# Patient Record
Sex: FEMALE | Race: WHITE | NOT HISPANIC OR LATINO | Employment: FULL TIME | URBAN - METROPOLITAN AREA
[De-identification: names, ages, dates, MRNs, and addresses within clinical notes are randomized per-mention and may not be internally consistent; named-entity substitution may affect disease eponyms.]

---

## 2023-04-12 ENCOUNTER — OFFICE VISIT (OUTPATIENT)
Dept: PRIMARY CARE | Facility: CLINIC | Age: 48
End: 2023-04-12
Payer: COMMERCIAL

## 2023-04-12 VITALS
HEIGHT: 64 IN | WEIGHT: 142.8 LBS | OXYGEN SATURATION: 99 % | SYSTOLIC BLOOD PRESSURE: 111 MMHG | HEART RATE: 79 BPM | RESPIRATION RATE: 18 BRPM | BODY MASS INDEX: 24.38 KG/M2 | DIASTOLIC BLOOD PRESSURE: 76 MMHG

## 2023-04-12 DIAGNOSIS — F41.8 DEPRESSION WITH ANXIETY: Primary | ICD-10-CM

## 2023-04-12 PROCEDURE — 99214 OFFICE O/P EST MOD 30 MIN: CPT | Performed by: SPECIALIST

## 2023-04-12 PROCEDURE — 1036F TOBACCO NON-USER: CPT | Performed by: SPECIALIST

## 2023-04-12 RX ORDER — ZOLPIDEM TARTRATE 10 MG/1
5 TABLET ORAL NIGHTLY PRN
COMMUNITY
End: 2023-10-10 | Stop reason: SDUPTHER

## 2023-04-12 RX ORDER — ALPRAZOLAM 0.25 MG/1
0.25 TABLET ORAL 2 TIMES DAILY PRN
Qty: 30 TABLET | Refills: 0 | Status: SHIPPED | OUTPATIENT
Start: 2023-04-12 | End: 2023-06-19

## 2023-04-12 RX ORDER — SERTRALINE HYDROCHLORIDE 50 MG/1
50 TABLET, FILM COATED ORAL DAILY
Qty: 90 TABLET | Refills: 0 | Status: SHIPPED | OUTPATIENT
Start: 2023-04-12 | End: 2023-06-14 | Stop reason: ALTCHOICE

## 2023-04-12 RX ORDER — ALPRAZOLAM 0.25 MG/1
0.25 TABLET ORAL 2 TIMES DAILY PRN
Qty: 30 TABLET | Refills: 0 | Status: SHIPPED | OUTPATIENT
Start: 2023-04-12 | End: 2023-04-12 | Stop reason: SDUPTHER

## 2023-04-12 ASSESSMENT — ENCOUNTER SYMPTOMS
DEPRESSION: 1
LOSS OF SENSATION IN FEET: 0
OCCASIONAL FEELINGS OF UNSTEADINESS: 0

## 2023-04-12 NOTE — PROGRESS NOTES
Subjective   Patient ID: Hermelinda Whelan is a 48 y.o. female who presents for Depression.    HPI    Tearful x few days ago but not well for ten days not sleeping well.  Brought notes, feeling a state of panic,   No known trigger, had a couple of nights of poor sleep and can over react when that happens in past. Starts to think the medication is not working well.  No one is hurting her at home, feels safe.    On zolpidem if she can fall asleep usually can stay asleep  Urine 6/2022    In early 20s, tried medication for sleep and trazodone (hated it), maybe something else and maybe an antidepressant  Had short term anxiety in past  (travelling) took in past low dose xanax in past which helped her.  Only took once.  Friends suggested CBC but she has not done that.    Thinks a hormonal component, got her menses yesterday.  Yesterday felt great and thought it was PMS but now upset since feels bad again today    Colonoscopy 2/15/23 repeat 10 years    Has upcoming trip to [place] for family event  Able to do workoutsOARRS:  Yvonne Arango, DO on 4/12/2023  2:50 PM  I have personally reviewed the OARRS report for Hermelinda Whelan. I have considered the risks of abuse, dependence, addiction and diversion    Is the patient prescribed a combination of a benzodiazepine and opioid?  No    Last Urine Drug Screen / ordered today: Yes  Recent Results (from the past 77244 hour(s))   OPIATE/OPIOID/BENZO PRESCRIPTION COMPLIANCE    Collection Time: 06/24/22  8:49 AM   Result Value Ref Range    DRUG SCREEN COMMENT URINE SEE BELOW     Creatine, Urine 83.3 mg/dL    Amphetamine Screen, Urine PRESUMPTIVE NEGATIVE NEGATIVE    Barbiturate Screen, Urine PRESUMPTIVE NEGATIVE NEGATIVE    Cannabinoid Screen, Urine PRESUMPTIVE NEGATIVE NEGATIVE    Cocaine Screen, Urine PRESUMPTIVE NEGATIVE NEGATIVE    PCP Screen, Urine PRESUMPTIVE NEGATIVE NEGATIVE    7-Aminoclonazepam <25 Cutoff <25 ng/mL    Alpha-Hydroxyalprazolam <25 Cutoff <25 ng/mL     Alpha-Hydroxymidazolam <25 Cutoff <25 ng/mL    Alprazolam <25 Cutoff <25 ng/mL    Chlordiazepoxide <25 Cutoff <25 ng/mL    Clonazepam <25 Cutoff <25 ng/mL    Diazepam <25 Cutoff <25 ng/mL    Lorazepam <25 Cutoff <25 ng/mL    Midazolam <25 Cutoff <25 ng/mL    Nordiazepam <25 Cutoff <25 ng/mL    Oxazepam <25 Cutoff <25 ng/mL    Temazepam <25 Cutoff <25 ng/mL    Zolpidem <25 Cutoff <25 ng/mL    Zolpidem Metabolite (ZCA) >1000 (A) Cutoff <25 ng/mL    6-Acetylmorphine <25 Cutoff <25 ng/mL    Codeine <50 Cutoff <50 ng/mL    Hydrocodone <25 Cutoff <25 ng/mL    Hydromorphone <25 Cutoff <25 ng/mL    Morphine Urine <50 Cutoff <50 ng/mL    Norhydrocodone <25 Cutoff <25 ng/mL    Noroxycodone <25 Cutoff <25 ng/mL    Oxycodone <25 Cutoff <25 ng/mL    Oxymorphone <25 Cutoff <25 ng/mL    Tramadol <50 Cutoff <50 ng/mL    O-Desmethyltramadol <50 Cutoff <50 ng/mL    Fentanyl <2.5 Cutoff<2.5 ng/mL    Norfentanyl <2.5 Cutoff<2.5 ng/mL    METHADONE CONFIRMATION,URINE <25 Cutoff <25 ng/mL    EDDP <25 Cutoff <25 ng/mL     Results are as expected.     Controlled Substance Agreement:  Date of the Last Agreement:   today  Reviewed Controlled Substance Agreement including but not limited to the benefits, risks, and alternatives to treatment with a Controlled Substance medication(s).    Benzodiazepines:  What is the patient's goal of therapy? Improved mood sleep and no panic attacs  Is this being achieved with current treatment? Just startign newmeds    AB-7:  No data recorded    Activities of Daily Living:   Is your overall impression that this patient is benefiting (symptom reduction outweighs side effects) from benzodiazepine therapy? Yes     1. Physical Functioning: Better  2. Family Relationship: Better  3. Social Relationship: Better  4. Mood: Better  5. Sleep Patterns: Better  6. Overall Function: Better and Sleep Aids:   What is the patient's goal of therapy? Sleep and not have panic attacks, also starting zoloft and referral for  therapy and med management   Is this being achieved with current treatment? Just starting new medications    Activities of Daily Living:   Is your overall impression that this patient is benefiting (symptom reduction outweighs side effects) from sleep aid therapy? Yes     1. Physical Functioning: Better  2. Family Relationship: Better  3. Social Relationship: Better  4. Mood: Better  5. Sleep Patterns: Better  6. Overall Function: Better      Review of Systems  Mood:  Positive anxiety and panic, No thoughts of self harm, but does not want to continue to feel this way, feeling total overwhelming, feels sad, hopeless  Cardiac No cp no palpitations but feels like heart is pounding  Resp:  No sob but doesn't feel like she is breathing normally when anxious no cough    Objective   Physical Exam  General:    Tearful, anxious F in no acute distress, well nourished, well hydrated  Head:  Normocephalic, atraumatic  Skin:          Warm dry,   Eyes:  Anicteric sclera, pupils equal,   Oral:      Mask in place, Not examined due to pandemic  Neck:   Supple  Cor:      Regular rate, normal S1, S2, no murmurs appreciated, no S3, no S4   Lungs:   Clear to auscultation b/l, no wheezes, no rhonchi, no crackles, no accessory respiratory muscle use    Assessment/Plan   Problem List Items Addressed This Visit          Other    Depression with anxiety - Primary     -Does sleep meditation tapes and tried yoga, walking dog, exercise and still having tremendous anxiety with panic and feels overwhelmed  -[facility] for [facility]  -Start Zoloft 50 mg daily to take one-half tablet for 4 days then increase to one tablet daily  -Ordered Xanax for short term use for panic         Relevant Medications    sertraline (Zoloft) 50 mg tablet    ALPRAZolam (Xanax) 0.25 mg tablet    Other Relevant Orders    TSH    T4, free    Referral to [facility]    Referral to [facility]    [facility]    Comprehensive Metabolic Panel          Yvonne Arango DO     Addendum:  4/10/2023 7:57 pm Paged by patient at approx 7:30 pm [facility] original prescription was sent to said not in stock, would not transfer to new [facility] since controlled substance, said in stock at [facility] on [address], and resent to that pharmacy.  Yvonne Arango DO

## 2023-04-12 NOTE — ASSESSMENT & PLAN NOTE
-Does sleep meditation tapes and tried yoga, walking dog, exercise and still having tremendous anxiety with panic and feels overwhelmed  -[facility] and for [facility]iatry for medication  -Start Zoloft 50 mg daily to take one-half tablet for 4 days then increase to one tablet daily  -Ordered Xanax for short term use for panic

## 2023-06-14 ENCOUNTER — LAB (OUTPATIENT)
Dept: LAB | Facility: LAB | Age: 48
End: 2023-06-14
Payer: COMMERCIAL

## 2023-06-14 ENCOUNTER — OFFICE VISIT (OUTPATIENT)
Dept: PRIMARY CARE | Facility: CLINIC | Age: 48
End: 2023-06-14
Payer: COMMERCIAL

## 2023-06-14 VITALS
SYSTOLIC BLOOD PRESSURE: 91 MMHG | DIASTOLIC BLOOD PRESSURE: 58 MMHG | BODY MASS INDEX: 24.31 KG/M2 | HEIGHT: 64 IN | WEIGHT: 142.4 LBS | HEART RATE: 52 BPM

## 2023-06-14 DIAGNOSIS — F41.9 ANXIETY: ICD-10-CM

## 2023-06-14 DIAGNOSIS — F51.04 CHRONIC INSOMNIA: ICD-10-CM

## 2023-06-14 DIAGNOSIS — Z00.00 ANNUAL PHYSICAL EXAM: Primary | ICD-10-CM

## 2023-06-14 DIAGNOSIS — R92.30 DENSE BREAST TISSUE ON MAMMOGRAM: ICD-10-CM

## 2023-06-14 PROBLEM — Z86.32 HISTORY OF GESTATIONAL DIABETES MELLITUS (GDM): Status: ACTIVE | Noted: 2022-06-14

## 2023-06-14 PROBLEM — L98.9 SKIN LESION: Status: ACTIVE | Noted: 2023-06-14

## 2023-06-14 PROBLEM — F41.8 DEPRESSION WITH ANXIETY: Status: RESOLVED | Noted: 2023-04-12 | Resolved: 2023-06-14

## 2023-06-14 PROCEDURE — 80373 DRUG SCREENING TRAMADOL: CPT

## 2023-06-14 PROCEDURE — 80368 SEDATIVE HYPNOTICS: CPT

## 2023-06-14 PROCEDURE — 80307 DRUG TEST PRSMV CHEM ANLYZR: CPT

## 2023-06-14 PROCEDURE — 80365 DRUG SCREENING OXYCODONE: CPT

## 2023-06-14 PROCEDURE — 1036F TOBACCO NON-USER: CPT | Performed by: SPECIALIST

## 2023-06-14 PROCEDURE — 80354 DRUG SCREENING FENTANYL: CPT

## 2023-06-14 PROCEDURE — 80346 BENZODIAZEPINES1-12: CPT

## 2023-06-14 PROCEDURE — 99396 PREV VISIT EST AGE 40-64: CPT | Performed by: SPECIALIST

## 2023-06-14 PROCEDURE — 80361 OPIATES 1 OR MORE: CPT

## 2023-06-14 PROCEDURE — 80358 DRUG SCREENING METHADONE: CPT

## 2023-06-14 NOTE — ASSESSMENT & PLAN NOTE
Thinks sleeplessness is triggering the anxiety  Previously signed 2 agreements, one for Ambien (other for xanax)  Does not need refill of Ambien 10 mg  yet

## 2023-06-14 NOTE — ASSESSMENT & PLAN NOTE
Undergoing therapy, will order #30 Xanax once urine is back, she takes sparingly, understands need for visit every 3 months  [facility] [address]

## 2023-06-14 NOTE — PROGRESS NOTES
Subjective   Patient ID: Hermelinda Whelan is a 48 y.o. female who presents for Annual Exam.  HPI    49 yo female Pmhx sig for Insomnia, Travel Anxiety, Gestational DM (1 of her pregnancies) and Fam Hx Melanoma (Father) presents for annual physical    Saw her in April, started Zoloft and Xanax for panic/anxiety and saw [facility]  Started both medications, within days felt worse but xanax helped, felt like the bottom of her body was falling out.  She stuck with the zoloft, went to [place], felt awful.  Sister in law's brother passed away suddenly and felt bad there.      Was barely sleeping and eating and feeling terrible, end of April was feeling a little better and had telehealth appt with [facility].  Thought she got bad generic version of Ambien and provider has seen it with ambien and adderall and thought that the generic had less active ingredient.      Psych recommended stopping ambien and she had been given a different generic.  She tapered off zoloft May 1.  No longer taking Xanax took last one about a week ago.  May was mostly okay.  Started feeling more like herself and was eating better.  Weight back to normal now.    May 1 to early June mostly back to normal.     Also started seeing telehealth therapist which she is continuing, met 4 times biweekly  Ambien has made a difference and psych told her to take an extra half tab if the 5 mg ambien is not working.      Over last 2 weeks has had some nights of poor sleep.  Also has isolated periods of more acute anxiety.  Has another big trip coming up.      Had been feeling much better until last week.  Not as bad as 2 months ago.  Having Xanax in April really truly helped.  Over the course of 2-3 weeks had taken about 25 pills up through end of May had 5-7 left which has taken now.  Goes weeks without taking them.     OARRS:  Yvonne Arango, DO on 6/14/2023 11:07 AM  I have personally reviewed the OARRS report for Hermelinda Whelan. I have  considered the risks of abuse, dependence, addiction and diversion    Is the patient prescribed a combination of a benzodiazepine and opioid?  No    Last Urine Drug Screen / ordered today: Yes  Recent Results (from the past 86462 hour(s))   OPIATE/OPIOID/BENZO PRESCRIPTION COMPLIANCE    Collection Time: 06/14/23 11:56 AM   Result Value Ref Range    DRUG SCREEN COMMENT URINE SEE BELOW     Creatine, Urine 35.7 mg/dL    Amphetamine Screen, Urine PRESUMPTIVE NEGATIVE NEGATIVE    Barbiturate Screen, Urine PRESUMPTIVE NEGATIVE NEGATIVE    Cannabinoid Screen, Urine PRESUMPTIVE NEGATIVE NEGATIVE    Cocaine Screen, Urine PRESUMPTIVE NEGATIVE NEGATIVE    PCP Screen, Urine PRESUMPTIVE NEGATIVE NEGATIVE     Results are as expected.     Controlled Substance Agreement:  Date of the Last Agreement::  4/12/2023  Reviewed Controlled Substance Agreement including but not limited to the benefits, risks, and alternatives to treatment with a Controlled Substance medication(s).    Benzodiazepines:  What is the patient's goal of therapy? Episodic anxiety  Is this being achieved with current treatment? yes    AB-7:  No data recorded    Activities of Daily Living:   Is your overall impression that this patient is benefiting (symptom reduction outweighs side effects) from benzodiazepine therapy? Yes     1. Physical Functioning: Better  2. Family Relationship: Better  3. Social Relationship: Better  4. Mood: Better  5. Sleep Patterns: Better  6. Overall Function: Better    No Known Allergies   Current Outpatient Medications   Medication Instructions    ALPRAZolam (XANAX) 0.25 mg, oral, 2 times daily PRN    zolpidem (AMBIEN) 5 mg, oral, Nightly PRN       Review of Systems  Constitutional  No fatigue, no fevers, no chills, no unintentional weight loss,   Mood is 5/10, ususally is 7.5-8/10, few mos ago 1/10, No anhedonia, some times sad, not tearful,   HEENT:  No headaches, no dizziness, no double vision, no blurred vision, no hearing  "loss  Cardiovascular:  No chest pain, no palpitations, no shortness of breath with exertion (one flight of stairs),   Respiratory:  No cough, no hemoptysis, no wheezing, No shortness of breath at rest  GI:  No dysphagia, no odynophagia, no reflux, no abdominal pain, no nausea, no vomiting, no changes in bowel habits, no bright red blood per rectum, no melena  :  No urinary frequency, no dysuria, no urine incontinence  MSK:  No falls, no joint pain, no joint swelling  Neuro:  No tremors, no extremity weakness, no changes in sensation  Breasts:  no abnormalities on self breast exam, no nipple discharge no skin changes    Physical Exam  BP 91/58   Pulse 52   Ht 1.626 m (5' 4\")   Wt 64.6 kg (142 lb 6.4 oz)   BMI 24.44 kg/m²   General:    Well-appearing  F in no acute distress, well nourished, well hydrated  Head:  Normocephalic, atraumatic  Skin:          Warm dry,   Eyes:  Anicteric sclera, pupils equal,   Ears:        TMs intact  Oral:      Mask in place  Neck:   Supple, no cervical/supraclavicular adenopathy, no thyromegaly or nodules appreciated on exam  Cor:      Regular rate, normal S1, S2, no murmurs appreciated, no S3, no S4   Lungs:   Clear to auscultation b/l, no wheezes, no rhonchi, no crackles, no accessory respiratory muscle use  Abd:          Soft, nontender, no guarding, no rebound, no hepatosplenomegaly appreciated   Ext:            No lower extremity edema, no palpable cords  Pulses:      Pedal pulses intact  Neuro:   CN2-12 grossly intact  (except CN 9-10, 12 not examined due to pandemic)     Assessment/Plan   Problem List Items Addressed This Visit       Anxiety     Undergoing therapy, will order #30 Xanax once urine is back, she takes sparingly, understands need for visit every 3 months  [facility] [address]         Relevant Orders    Opiate/Opioid/Benzo Extended Prescription Compliance    Chronic insomnia     Thinks sleeplessness is triggering the anxiety  Previously signed 2 " agreements, one for Ambien (other for xanax)  Does not need refill of Ambien 10 mg  yet         Relevant Orders    Opiate/Opioid/Benzo Extended Prescription Compliance    Annual physical exam - Primary     -Recommend annual influenza vaccine  -Previously received 2 Covid vaccines 1 booster and one bivalent in fall 2022  -Prior Tdap 10 years ago (when daughter was born), recommend Tdap (to check your insurance coverage)  -Continue annual gynecology exams (sees gynecology at Clinic but retired so plans to see gyne here)  - Continue annual mammograms (12/5/2022 at [facility]), dense breast tissue, fam hx breast cancer paternal side of family  -Recommend regular weight bearing exercise as tolerated (walking) and has a peleton  -Recommend Vitamin D3 cholecalciferol 1000 units daily  -Recommend daily calcium intake of 1000 units ideally through diet (or diet and supplement)  -Recommend Colonoscopy 2/15/2023 repeat 10 years  -Recommend annual dermatology evaluations (family hx of melanoma) did past summer  -Normal BMI 24  -Labs ordered: CMP, CBC, Fx Lipids         Relevant Orders    CBC    Comprehensive Metabolic Panel    Lipid Panel    Dense breast tissue on mammogram     Discussed self pay FAST MRI for dense breast               Yvonne Arango,

## 2023-06-14 NOTE — ASSESSMENT & PLAN NOTE
-Recommend annual influenza vaccine  -Previously received 2 Covid vaccines 1 booster and one bivalent in fall 2022  -Prior Tdap 10 years ago (when daughter was born), recommend Tdap (to check your insurance coverage)  -Continue annual gynecology exams (sees gynecology at Clinic but retired so plans to see gyne here)  - Continue annual mammograms ([facility]), dense breast tissue, fam hx breast cancer paternal side of family  -Recommend regular weight bearing exercise as tolerated (walking) and has a peleton  -Recommend Vitamin D3 cholecalciferol 1000 units daily  -Recommend daily calcium intake of 1000 units ideally through diet (or diet and supplement)  -Recommend Colonoscopy 2/15/2023 repeat 10 years  -Recommend annual dermatology evaluations (family hx of melanoma) did past summer  -Normal BMI 24  -Labs ordered: CMP, CBC, Fx Lipids

## 2023-06-19 LAB
6-ACETYLMORPHINE: <25 NG/ML
7-AMINOCLONAZEPAM: <25 NG/ML
ALPHA-HYDROXYALPRAZOLAM: <25 NG/ML
ALPHA-HYDROXYMIDAZOLAM: <25 NG/ML
ALPRAZOLAM: <25 NG/ML
AMPHETAMINE (PRESENCE) IN URINE BY SCREEN METHOD: ABNORMAL
BARBITURATES PRESENCE IN URINE BY SCREEN METHOD: ABNORMAL
CANNABINOIDS IN URINE BY SCREEN METHOD: ABNORMAL
CHLORDIAZEPOXIDE: <25 NG/ML
CLONAZEPAM: <25 NG/ML
COCAINE (PRESENCE) IN URINE BY SCREEN METHOD: ABNORMAL
CODEINE: <50 NG/ML
CREATINE, URINE FOR DRUG: 35.7 MG/DL
DIAZEPAM: <25 NG/ML
DRUG SCREEN COMMENT URINE: ABNORMAL
EDDP: <25 NG/ML
FENTANYL CONFIRMATION, URINE: <2.5 NG/ML
HYDROCODONE: <25 NG/ML
HYDROMORPHONE: <25 NG/ML
LORAZEPAM: <25 NG/ML
METHADONE CONFIRMATION,URINE: <25 NG/ML
MIDAZOLAM: <25 NG/ML
MORPHINE URINE: <50 NG/ML
NORDIAZEPAM: <25 NG/ML
NORFENTANYL: <2.5 NG/ML
NORHYDROCODONE: <25 NG/ML
NOROXYCODONE: <25 NG/ML
O-DESMETHYLTRAMADOL: <50 NG/ML
OXAZEPAM: <25 NG/ML
OXYCODONE: <25 NG/ML
OXYMORPHONE: <25 NG/ML
PHENCYCLIDINE (PRESENCE) IN URINE BY SCREEN METHOD: ABNORMAL
TEMAZEPAM: <25 NG/ML
TRAMADOL: <50 NG/ML
ZOLPIDEM METABOLITE (ZCA): 710 NG/ML
ZOLPIDEM: <25 NG/ML

## 2023-09-22 ENCOUNTER — OFFICE VISIT (OUTPATIENT)
Dept: PRIMARY CARE | Facility: CLINIC | Age: 48
End: 2023-09-22
Payer: COMMERCIAL

## 2023-09-22 VITALS
HEART RATE: 56 BPM | DIASTOLIC BLOOD PRESSURE: 64 MMHG | BODY MASS INDEX: 24.37 KG/M2 | SYSTOLIC BLOOD PRESSURE: 95 MMHG | OXYGEN SATURATION: 98 % | RESPIRATION RATE: 18 BRPM | WEIGHT: 142 LBS

## 2023-09-22 DIAGNOSIS — F51.04 CHRONIC INSOMNIA: Primary | ICD-10-CM

## 2023-09-22 DIAGNOSIS — F41.8 DEPRESSION WITH ANXIETY: ICD-10-CM

## 2023-09-22 DIAGNOSIS — F41.9 ANXIETY: ICD-10-CM

## 2023-09-22 PROCEDURE — 99213 OFFICE O/P EST LOW 20 MIN: CPT | Performed by: SPECIALIST

## 2023-09-22 PROCEDURE — 1036F TOBACCO NON-USER: CPT | Performed by: SPECIALIST

## 2023-09-22 RX ORDER — ALPRAZOLAM 0.25 MG/1
0.25 TABLET ORAL 2 TIMES DAILY PRN
Qty: 30 TABLET | Refills: 0 | Status: SHIPPED | OUTPATIENT
Start: 2023-09-22 | End: 2023-12-06 | Stop reason: SDUPTHER

## 2023-09-22 NOTE — ASSESSMENT & PLAN NOTE
Takes xanax sparingly as needed for anxiety  CSA 6/14/2023  Urine done 6/14/2023 consistent  Refilled xanax, f/up 3 mos

## 2023-09-22 NOTE — ASSESSMENT & PLAN NOTE
Takes ambien as needed for sleep   Does not yet need refil  CSA 6/14/2023  Urine 6/14/2023  Has follow-up

## 2023-09-22 NOTE — PROGRESS NOTES
Subjective   Patient ID: Hermelinda Whelan is a 48 y.o. female who presents for Follow-up.  HPI    47 yo female Pmhx sig for Insomnia, Travel Anxiety, Gestational DM (1 of her pregnancies) and Fam Hx Melanoma (Father) presents for medication follow-up    Did CPE 6/2023    OARRS:  Yvonne Arango,  on 9/22/2023  8:19 AM  I have personally reviewed the OARRS report for Hermelinda Whelan. I have considered the risks of abuse, dependence, addiction and diversion    Is the patient prescribed a combination of a benzodiazepine and opioid?  No    Last Urine Drug Screen / ordered today: No urine just done 6/14/2023  Recent Results (from the past 8760 hour(s))   OPIATE/OPIOID/BENZO PRESCRIPTION COMPLIANCE    Collection Time: 06/14/23 11:56 AM   Result Value Ref Range    DRUG SCREEN COMMENT URINE SEE BELOW     Creatine, Urine 35.7 mg/dL    Amphetamine Screen, Urine PRESUMPTIVE NEGATIVE NEGATIVE    Barbiturate Screen, Urine PRESUMPTIVE NEGATIVE NEGATIVE    Cannabinoid Screen, Urine PRESUMPTIVE NEGATIVE NEGATIVE    Cocaine Screen, Urine PRESUMPTIVE NEGATIVE NEGATIVE    PCP Screen, Urine PRESUMPTIVE NEGATIVE NEGATIVE    7-Aminoclonazepam <25 Cutoff <25 ng/mL    Alpha-Hydroxyalprazolam <25 Cutoff <25 ng/mL    Alpha-Hydroxymidazolam <25 Cutoff <25 ng/mL    Alprazolam <25 Cutoff <25 ng/mL    Chlordiazepoxide <25 Cutoff <25 ng/mL    Clonazepam <25 Cutoff <25 ng/mL    Diazepam <25 Cutoff <25 ng/mL    Lorazepam <25 Cutoff <25 ng/mL    Midazolam <25 Cutoff <25 ng/mL    Nordiazepam <25 Cutoff <25 ng/mL    Oxazepam <25 Cutoff <25 ng/mL    Temazepam <25 Cutoff <25 ng/mL    Zolpidem <25 Cutoff <25 ng/mL    Zolpidem Metabolite (ZCA) 710 (A) Cutoff <25 ng/mL    6-Acetylmorphine <25 Cutoff <25 ng/mL    Codeine <50 Cutoff <50 ng/mL    Hydrocodone <25 Cutoff <25 ng/mL    Hydromorphone <25 Cutoff <25 ng/mL    Morphine Urine <50 Cutoff <50 ng/mL    Norhydrocodone <25 Cutoff <25 ng/mL    Noroxycodone <25 Cutoff <25 ng/mL    Oxycodone <25  Cutoff <25 ng/mL    Oxymorphone <25 Cutoff <25 ng/mL    Tramadol <50 Cutoff <50 ng/mL    O-Desmethyltramadol <50 Cutoff <50 ng/mL    Fentanyl <2.5 Cutoff<2.5 ng/mL    Norfentanyl <2.5 Cutoff<2.5 ng/mL    METHADONE CONFIRMATION,URINE <25 Cutoff <25 ng/mL    EDDP <25 Cutoff <25 ng/mL     Results are as expected.         Controlled Substance Agreement:  Date of the Last Agreement  6/14/2023  Reviewed Controlled Substance Agreement including but not limited to the benefits, risks, and alternatives to treatment with a Controlled Substance medication(s).    Benzodiazepines:  What is the patient's goal of therapy?  Control anxiety if needed, uses xanax sparingly, and also saw Psychiatry and Mental Health Counselor this spring,  Ambien working for sleep and previously saw sleep specialist  Is this being achieved with current treatment?  yes    AB-7:  No data recorded    Activities of Daily Living:   Is your overall impression that this patient is benefiting (symptom reduction outweighs side effects) from benzodiazepine therapy? Yes     1. Physical Functioning: Better  2. Family Relationship: Better  3. Social Relationship: Better  4. Mood: Better  5. Sleep Patterns: Better  6. Overall Function: Better and Sleep Aids:       No Known Allergies   Current Outpatient Medications   Medication Instructions    ALPRAZolam (XANAX) 0.25 mg, oral, 2 times daily PRN    zolpidem (AMBIEN) 5 mg, oral, Nightly PRN        Review of Systems  Constitutional  No fatigue, no fevers, no chills, no unintentional weight loss,   HEENT:  No headaches, no dizziness,   Mood:  No anhedonia, no panic attacks, some times generalized anxiety, no suicidal ideation  Cardiovascular:  No chest pain, no palpitations,  Respiratory:  No cough,, No shortness of breast  MSK:  No falls, no joint pain    Physical Exam  BP 95/64   Pulse 56   Resp 18   Wt 64.4 kg (142 lb)   SpO2 98%   BMI 24.37 kg/m²   General:    Well-appearing  F in no acute distress, well  nourished, well hydrated  Head:  Normocephalic, atraumatic  Skin:          Warm dry,   Eyes:  Anicteric sclera, pupils equal,   Oral:      Not examined due to pandemic  Neck:   Supple,   Cor:      Regular rate, normal S1, S2, no murmurs appreciated, no S3, no S4   Lungs:   Clear to auscultation b/l, no wheezes, no rhonchi, no crackles, no accessory respiratory muscle use    Assessment/Plan   Problem List Items Addressed This Visit       Anxiety     Takes xanax sparingly as needed for anxiety  CSA 6/14/2023  Urine done 6/14/2023 consistent  Refilled xanax, f/up 3 mos         Chronic insomnia - Primary     Takes ambien as needed for sleep   Does not yet need refil  CSA 6/14/2023  Urine 6/14/2023  Has follow-up           Other Visit Diagnoses       Depression with anxiety        Relevant Medications    ALPRAZolam (Xanax) 0.25 mg tablet          Had Flu shot  Plans to get covid vaccine this fall  Has f/up appt 3 mos       Yvonne Arango, DO

## 2023-10-10 DIAGNOSIS — F51.04 CHRONIC INSOMNIA: Primary | ICD-10-CM

## 2023-10-10 RX ORDER — ZOLPIDEM TARTRATE 10 MG/1
10 TABLET ORAL NIGHTLY PRN
Qty: 90 TABLET | Refills: 1 | Status: SHIPPED | OUTPATIENT
Start: 2023-10-10 | End: 2024-01-05 | Stop reason: SDUPTHER

## 2023-11-09 ENCOUNTER — APPOINTMENT (OUTPATIENT)
Dept: DERMATOLOGY | Facility: CLINIC | Age: 48
End: 2023-11-09
Payer: COMMERCIAL

## 2023-11-17 ENCOUNTER — APPOINTMENT (OUTPATIENT)
Dept: OBSTETRICS AND GYNECOLOGY | Facility: CLINIC | Age: 48
End: 2023-11-17
Payer: COMMERCIAL

## 2023-11-17 ENCOUNTER — HOSPITAL ENCOUNTER (OUTPATIENT)
Dept: RADIOLOGY | Facility: EXTERNAL LOCATION | Age: 48
Discharge: HOME | End: 2023-11-17

## 2023-11-17 ENCOUNTER — HOSPITAL ENCOUNTER (OUTPATIENT)
Dept: RADIOLOGY | Facility: HOSPITAL | Age: 48
Discharge: HOME | End: 2023-11-17
Payer: COMMERCIAL

## 2023-11-17 DIAGNOSIS — Z12.31 SCREENING MAMMOGRAM FOR BREAST CANCER: ICD-10-CM

## 2023-11-17 PROCEDURE — 77067 SCR MAMMO BI INCL CAD: CPT | Performed by: RADIOLOGY

## 2023-11-17 PROCEDURE — 77063 BREAST TOMOSYNTHESIS BI: CPT

## 2023-11-17 PROCEDURE — 77063 BREAST TOMOSYNTHESIS BI: CPT | Performed by: RADIOLOGY

## 2023-12-01 ENCOUNTER — OFFICE VISIT (OUTPATIENT)
Dept: OBSTETRICS AND GYNECOLOGY | Facility: CLINIC | Age: 48
End: 2023-12-01
Payer: COMMERCIAL

## 2023-12-01 VITALS
HEIGHT: 64 IN | BODY MASS INDEX: 24.59 KG/M2 | WEIGHT: 144 LBS | DIASTOLIC BLOOD PRESSURE: 60 MMHG | SYSTOLIC BLOOD PRESSURE: 91 MMHG

## 2023-12-01 DIAGNOSIS — Z01.419 NORMAL GYNECOLOGIC EXAMINATION: Primary | ICD-10-CM

## 2023-12-01 PROCEDURE — 1036F TOBACCO NON-USER: CPT | Performed by: OBSTETRICS & GYNECOLOGY

## 2023-12-01 PROCEDURE — 99204 OFFICE O/P NEW MOD 45 MIN: CPT | Performed by: OBSTETRICS & GYNECOLOGY

## 2023-12-01 ASSESSMENT — ENCOUNTER SYMPTOMS
MUSCULOSKELETAL NEGATIVE: 1
CONSTITUTIONAL NEGATIVE: 1
EYES NEGATIVE: 1
PSYCHIATRIC NEGATIVE: 1
ALLERGIC/IMMUNOLOGIC NEGATIVE: 1
CARDIOVASCULAR NEGATIVE: 1
NEUROLOGICAL NEGATIVE: 1
RESPIRATORY NEGATIVE: 1
GASTROINTESTINAL NEGATIVE: 1
ENDOCRINE NEGATIVE: 1
HEMATOLOGIC/LYMPHATIC NEGATIVE: 1

## 2023-12-01 NOTE — PROGRESS NOTES
Subjective   Patient ID: Hermelinda Whelan is a 48 y.o. female who presents for Establishing Care (New pt is here to establish care. /Last pap:  per pt 2022 (norm)/Last mayda:  2023  norm/Last colon screen:  per pt 2023/Declines chaperone.   Tiff Ricketts LPN).  HPI patient is here to establish care she is 48-year-old female  2 para 2 2 spontaneous vaginal deliveries last menstrual period 2023 menarche at the age of 14.  Every 30+ days lasting 3 to 4 days with some cramping patient has no history of dyspareunia she uses condoms for contraception last Pap test  negative negative last mammogram 2 weeks ago patient does not smoke she drinks occasionally alcohol she does not use drugs she uses Ambien daily she has no allergies past medical history significant for insomnia surgical history negative family history positive for carcinoma breast and hypertension    Review of Systems   Constitutional: Negative.    Eyes: Negative.    Respiratory: Negative.     Cardiovascular: Negative.    Gastrointestinal: Negative.    Endocrine: Negative.    Genitourinary: Negative.    Musculoskeletal: Negative.    Skin: Negative.    Allergic/Immunologic: Negative.    Neurological: Negative.    Hematological: Negative.    Psychiatric/Behavioral: Negative.         Objective   Physical Exam  Constitutional:       Appearance: Normal appearance.   HENT:      Head: Normocephalic and atraumatic.   Cardiovascular:      Rate and Rhythm: Normal rate and regular rhythm.      Pulses: Normal pulses.      Heart sounds: Normal heart sounds.   Pulmonary:      Effort: Pulmonary effort is normal.      Breath sounds: Normal breath sounds.   Abdominal:      General: Abdomen is flat. Bowel sounds are normal.      Palpations: Abdomen is soft.      Hernia: There is no hernia in the left inguinal area or right inguinal area.   Genitourinary:     General: Normal vulva.      Exam position: Lithotomy position.      Labia:          Right: No rash, tenderness or lesion.         Left: No rash, tenderness or lesion.       Urethra: No prolapse.      Vagina: Normal.      Cervix: Normal.      Uterus: Normal.       Adnexa: Right adnexa normal and left adnexa normal.   Musculoskeletal:      Cervical back: Normal range of motion and neck supple.   Skin:     General: Skin is warm and dry.   Neurological:      General: No focal deficit present.      Mental Status: She is alert and oriented to person, place, and time.         Assessment/Plan    normal gynecologic examination  Pap test up-to-date  Mammogram up-to-date

## 2023-12-06 ENCOUNTER — OFFICE VISIT (OUTPATIENT)
Dept: PRIMARY CARE | Facility: CLINIC | Age: 48
End: 2023-12-06
Payer: COMMERCIAL

## 2023-12-06 VITALS
BODY MASS INDEX: 24.72 KG/M2 | OXYGEN SATURATION: 98 % | WEIGHT: 144 LBS | RESPIRATION RATE: 18 BRPM | DIASTOLIC BLOOD PRESSURE: 71 MMHG | SYSTOLIC BLOOD PRESSURE: 105 MMHG | HEART RATE: 51 BPM

## 2023-12-06 DIAGNOSIS — F51.04 CHRONIC INSOMNIA: ICD-10-CM

## 2023-12-06 DIAGNOSIS — F41.9 ANXIETY: ICD-10-CM

## 2023-12-06 PROCEDURE — 99213 OFFICE O/P EST LOW 20 MIN: CPT | Performed by: SPECIALIST

## 2023-12-06 PROCEDURE — 1036F TOBACCO NON-USER: CPT | Performed by: SPECIALIST

## 2023-12-06 RX ORDER — ALPRAZOLAM 0.25 MG/1
0.25 TABLET ORAL 2 TIMES DAILY PRN
Qty: 30 TABLET | Refills: 0 | Status: SHIPPED | OUTPATIENT
Start: 2023-12-06 | End: 2024-01-12 | Stop reason: SDUPTHER

## 2023-12-06 NOTE — PROGRESS NOTES
Subjective   Patient ID: Hermelinda Whelan is a 48 y.o. female who presents for Follow-up.  HPI    47 yo female Pmhx sig for Insomnia, Travel Anxiety, Gestational DM (1 of her pregnancies) and Fam Hx Melanoma (Father) presents for medication follow-up (annual exam done 6/14/2023)     LOV 9/22/2023    Ambien mostly working, no abnormal sleep behavior   Will need refill for Xanax today, doesn't need ambien yet (thinks ok until Jan)    OARRS:  Yvonne Arango, DO on 12/6/2023 10:57 AM  I have personally reviewed the OARRS report for Hermelinda Whelan. I have considered the risks of abuse, dependence, addiction and diversion    Is the patient prescribed a combination of a benzodiazepine and opioid?  No    Last Urine Drug Screen / ordered today: Yes  Recent Results (from the past 8760 hour(s))   OPIATE/OPIOID/BENZO PRESCRIPTION COMPLIANCE    Collection Time: 06/14/23 11:56 AM   Result Value Ref Range    DRUG SCREEN COMMENT URINE SEE BELOW     Creatine, Urine 35.7 mg/dL    Amphetamine Screen, Urine PRESUMPTIVE NEGATIVE NEGATIVE    Barbiturate Screen, Urine PRESUMPTIVE NEGATIVE NEGATIVE    Cannabinoid Screen, Urine PRESUMPTIVE NEGATIVE NEGATIVE    Cocaine Screen, Urine PRESUMPTIVE NEGATIVE NEGATIVE    PCP Screen, Urine PRESUMPTIVE NEGATIVE NEGATIVE    7-Aminoclonazepam <25 Cutoff <25 ng/mL    Alpha-Hydroxyalprazolam <25 Cutoff <25 ng/mL    Alpha-Hydroxymidazolam <25 Cutoff <25 ng/mL    Alprazolam <25 Cutoff <25 ng/mL    Chlordiazepoxide <25 Cutoff <25 ng/mL    Clonazepam <25 Cutoff <25 ng/mL    Diazepam <25 Cutoff <25 ng/mL    Lorazepam <25 Cutoff <25 ng/mL    Midazolam <25 Cutoff <25 ng/mL    Nordiazepam <25 Cutoff <25 ng/mL    Oxazepam <25 Cutoff <25 ng/mL    Temazepam <25 Cutoff <25 ng/mL    Zolpidem <25 Cutoff <25 ng/mL    Zolpidem Metabolite (ZCA) 710 (A) Cutoff <25 ng/mL    6-Acetylmorphine <25 Cutoff <25 ng/mL    Codeine <50 Cutoff <50 ng/mL    Hydrocodone <25 Cutoff <25 ng/mL    Hydromorphone <25 Cutoff <25  ng/mL    Morphine Urine <50 Cutoff <50 ng/mL    Norhydrocodone <25 Cutoff <25 ng/mL    Noroxycodone <25 Cutoff <25 ng/mL    Oxycodone <25 Cutoff <25 ng/mL    Oxymorphone <25 Cutoff <25 ng/mL    Tramadol <50 Cutoff <50 ng/mL    O-Desmethyltramadol <50 Cutoff <50 ng/mL    Fentanyl <2.5 Cutoff<2.5 ng/mL    Norfentanyl <2.5 Cutoff<2.5 ng/mL    METHADONE CONFIRMATION,URINE <25 Cutoff <25 ng/mL    EDDP <25 Cutoff <25 ng/mL     Results are as expected.         Controlled Substance Agreement:  Date of the Last Agreement:  6/14/2023  Reviewed Controlled Substance Agreement including but not limited to the benefits, risks, and alternatives to treatment with a Controlled Substance medication(s).    Benzodiazepines:  What is the patient's goal of therapy? Reduction of anxiety and prevent panic attack and sleep  Is this being achieved with current treatment? Yes for the most part    AB-7:  No data recorded    Activities of Daily Living:   Is your overall impression that this patient is benefiting (symptom reduction outweighs side effects) from benzodiazepine therapy? Yes     1. Physical Functioning: Better  2. Family Relationship: Better  3. Social Relationship: Better  4. Mood: Better  5. Sleep Patterns: Better  6. Overall Function: Better and Sleep Aids:   What is the patient's goal of therapy? Nightly restful sleep  Is this being achieved with current treatment? Most nights    Activities of Daily Living:   Is your overall impression that this patient is benefiting (symptom reduction outweighs side effects) from sleep aid therapy? Yes     1. Physical Functioning: Better  2. Family Relationship: Better  3. Social Relationship: Better  4. Mood: Better  5. Sleep Patterns: Better  6. Overall Function: Better      No Known Allergies   Current Outpatient Medications   Medication Instructions    ALPRAZolam (XANAX) 0.25 mg, oral, 2 times daily PRN    zolpidem (AMBIEN) 10 mg, oral, Nightly PRN        Review of Systems  Constitutional   No fatigue, no fevers, no chills, no unintentional weight loss,   HEENT:  No headaches, no dizziness,  Cardiovascular:  No chest pain, no palpitations, no shortness of breath with exertion (one flight of stairs),   Respiratory:  No cough,  no wheezing  GI:  No abdominal pain, no nausea, no vomiting, no changes in bowel habits, no bright red blood per rectum, no melena  MSK:  No falls, no joint pain, no joint swelling  Neuro:  No tremors, no extremity weakness, no changes in sensation    Physical Exam  /71   Pulse 51   Resp 18   Wt 65.3 kg (144 lb)   LMP 11/09/2023 (Exact Date)   SpO2 98%   BMI 24.72 kg/m²   General:    Well-appearing  F in no acute distress, well nourished, well hydrated  Head:  Normocephalic, atraumatic  Skin:          Warm dry,   Eyes:  Anicteric sclera, pupils equal,   Oral:      Not examined due to pandemic  Neck:   Supple  Cor:      Regular rate, normal S1, S2, no murmurs appreciated, no S3, no S4   Lungs:   Clear to auscultation b/l, no wheezes, no rhonchi, no crackles, no accessory respiratory     Assessment/Plan   Problem List Items Addressed This Visit       Anxiety     Takes xanax sparingly as needed for anxiety  CSA 6/14/2023  Urine done 6/14/2023 consistent  Ordered bid prn takes about 2 x per week  Never takes more than once a day and usually 2 sancho per week and some times none per week  Refilled  F/up 3 mos         Relevant Medications    ALPRAZolam (Xanax) 0.25 mg tablet    Chronic insomnia     Chronic insomnia   akes ambien as needed for sleep   Does not yet need refil  CSA 6/14/2023  Urine 6/14/2023  Has follow-up scheduled          Health care maintenance  -Previously received annual influenza vaccine  -Due for Covid vaccine, recommended  -Annual due in 6/2024       Yvonne Arango DO

## 2023-12-18 NOTE — ASSESSMENT & PLAN NOTE
Chronic insomnia   akes ambien as needed for sleep   Does not yet need refil  CSA 6/14/2023  Urine 6/14/2023  Has follow-up scheduled

## 2023-12-18 NOTE — ASSESSMENT & PLAN NOTE
Takes xanax sparingly as needed for anxiety  CSA 6/14/2023  Urine done 6/14/2023 consistent  Ordered bid prn takes about 2 x per week  Never takes more than once a day and usually 2 sancho per week and some times none per week  Refilled  F/up 3 mos

## 2024-01-05 ENCOUNTER — TELEMEDICINE (OUTPATIENT)
Dept: BEHAVIORAL HEALTH | Facility: CLINIC | Age: 49
End: 2024-01-05
Payer: COMMERCIAL

## 2024-01-05 DIAGNOSIS — F41.1 GAD (GENERALIZED ANXIETY DISORDER): ICD-10-CM

## 2024-01-05 DIAGNOSIS — F41.9 ANXIETY: ICD-10-CM

## 2024-01-05 DIAGNOSIS — F51.04 CHRONIC INSOMNIA: ICD-10-CM

## 2024-01-05 PROCEDURE — 99214 OFFICE O/P EST MOD 30 MIN: CPT | Performed by: PSYCHIATRY & NEUROLOGY

## 2024-01-05 RX ORDER — BUSPIRONE HYDROCHLORIDE 10 MG/1
10 TABLET ORAL 2 TIMES DAILY
Qty: 60 TABLET | Refills: 2 | Status: SHIPPED | OUTPATIENT
Start: 2024-01-05 | End: 2024-03-07 | Stop reason: ALTCHOICE

## 2024-01-05 RX ORDER — ZOLPIDEM TARTRATE 10 MG/1
15 TABLET ORAL NIGHTLY PRN
Qty: 45 TABLET | Refills: 2 | Status: SHIPPED | OUTPATIENT
Start: 2024-01-05 | End: 2024-03-26 | Stop reason: SDUPTHER

## 2024-01-05 ASSESSMENT — ENCOUNTER SYMPTOMS
SLEEP DISTURBANCE: 1
NERVOUS/ANXIOUS: 1

## 2024-01-05 NOTE — ASSESSMENT & PLAN NOTE
Patient seems to have developed more of a generalized anxiety picture during the day, with the nidus of this being rooted in occasional insomnia.  Therefore we will order buspirone 10 mg twice daily for some of the generalized anxiety disorder hoping that less worry during the day will not set her up for high anxiety at night and therefore self fulfilling prophecy of insomnia.  Will allow her to go up to 15 mg daily of zolpidem as needed for sleep, and Xanax will be 0.25 2.5 mg at night about 3 hours before bedtime she is having extreme anxiety about not sleeping.  Follow-up in 3 weeks

## 2024-01-05 NOTE — PROGRESS NOTES
Subjective   Patient ID: Hermelinda Whelan is a 48 y.o. female who presents for urgent visit for medication management and therapy..  HPI Micah had been doing well since her last visit however she is starting to have extreme anxiety and preoccupation with not sleeping.  She remains preoccupied and upset during the day and creates a self-fulfilling prophecy as nighttime approaches.  Throughout her history the patient has developed secondary generalized anxiety when she does not sleep for a few nights.  She has been taking Xanax which has been helpful but she is afraid of taking Xanax.  She also recalls that we had allowed her to go up to 15 mg daily on the Ambien if needed since she has been on Ambien for 20+ years at the 10 mg dose with good success.  Patient is having a lot of intrusive thoughts about not sleeping but also about other things like family health, her own health the future etc.    Review of Systems   Psychiatric/Behavioral:  Positive for sleep disturbance. The patient is nervous/anxious.        Objective   Physical Exam  Psychiatric:         Attention and Perception: Attention and perception normal.         Mood and Affect: Mood is anxious. Affect is tearful.         Speech: Speech normal.         Behavior: Behavior normal. Behavior is cooperative.         Thought Content: Thought content normal.         Cognition and Memory: Cognition and memory normal.         Judgment: Judgment normal.      Comments: Anxious preoccupation with not sleeping but also other generalized anxiety items.         Assessment/Plan   Problem List Items Addressed This Visit             ICD-10-CM    Anxiety F41.9     Patient seems to have developed more of a generalized anxiety picture during the day, with the nidus of this being rooted in occasional insomnia.  Therefore we will order buspirone 10 mg twice daily for some of the generalized anxiety disorder hoping that less worry during the day will not set her up for high  anxiety at night and therefore self fulfilling prophecy of insomnia.  Will allow her to go up to 15 mg daily of zolpidem as needed for sleep, and Xanax will be 0.25 2.5 mg at night about 3 hours before bedtime she is having extreme anxiety about not sleeping.  Follow-up in 3 weeks         Chronic insomnia F51.04    Relevant Medications    zolpidem (Ambien) 10 mg tablet     Other Visit Diagnoses         Codes    AB (generalized anxiety disorder)     F41.1    Relevant Medications    busPIRone (Buspar) 10 mg tablet                 Art Damian MD 01/05/24 1:09 PM

## 2024-01-12 RX ORDER — ALPRAZOLAM 0.25 MG/1
0.25 TABLET ORAL 2 TIMES DAILY PRN
Qty: 30 TABLET | Refills: 1 | Status: SHIPPED | OUTPATIENT
Start: 2024-01-12 | End: 2024-02-28 | Stop reason: SDUPTHER

## 2024-02-05 ENCOUNTER — TELEMEDICINE (OUTPATIENT)
Dept: BEHAVIORAL HEALTH | Facility: CLINIC | Age: 49
End: 2024-02-05
Payer: COMMERCIAL

## 2024-02-05 DIAGNOSIS — F41.9 ANXIETY: ICD-10-CM

## 2024-02-05 PROCEDURE — 99214 OFFICE O/P EST MOD 30 MIN: CPT | Performed by: PSYCHIATRY & NEUROLOGY

## 2024-02-05 PROCEDURE — 1036F TOBACCO NON-USER: CPT | Performed by: PSYCHIATRY & NEUROLOGY

## 2024-02-05 ASSESSMENT — ENCOUNTER SYMPTOMS
SLEEP DISTURBANCE: 1
NERVOUS/ANXIOUS: 1

## 2024-02-05 NOTE — PROGRESS NOTES
Subjective   Patient ID: Hermelinda Whelan is a 48 y.o. female who presents for medication management visit.  HPI reviewed last visit with patient.  Patient did start buspirone and has been taking it regularly 10 mg twice daily.  This has started helping after about 2 weeks.  She now is able to function during the day without constantly getting preoccupied with what is coming up at night whether she will sleep or not.  She has been minimizing use of extra Ambien at night only taking 10 to 12-1/2 mg at night rather than the full 15 as discussed.  Likewise she is only taking Xanax 0.25 mg a couple of nights a week.  Sleep is not great and she is tired the next day but she has a lot of responsibilities as well.  She was sleeps about maybe 5 hours roughly per night.    Review of Systems   Psychiatric/Behavioral:  Positive for sleep disturbance. The patient is nervous/anxious.        Objective   Physical Exam  Psychiatric:         Attention and Perception: Attention and perception normal.         Mood and Affect: Affect normal. Mood is anxious.         Speech: Speech normal.         Behavior: Behavior normal.         Thought Content: Thought content normal.         Cognition and Memory: Cognition and memory normal.         Judgment: Judgment normal.      Comments: Anticipatory anxiety remains somewhat high but better with the BuSpar.         Assessment/Plan   Problem List Items Addressed This Visit             ICD-10-CM    Anxiety F41.9     Trigger for high anxiety continues to be perceived threats to insomnia.  Continue current regimen with buspirone 10 mg twice daily and Ambien 12-1/2 to 15 mg at night in addition to as needed Xanax on nights when the anxiety breaks through the BuSpar.  A later option would be to increase buspirone dosage to 15 mg twice daily.  Follow-up 4 weeks                 Art Damian MD 02/05/24 6:03 PM

## 2024-02-05 NOTE — ASSESSMENT & PLAN NOTE
Trigger for high anxiety continues to be perceived threats to insomnia.  Continue current regimen with buspirone 10 mg twice daily and Ambien 12-1/2 to 15 mg at night in addition to as needed Xanax on nights when the anxiety breaks through the BuSpar.  A later option would be to increase buspirone dosage to 15 mg twice daily.  Follow-up 4 weeks

## 2024-02-21 ASSESSMENT — DERMATOLOGY QUALITY OF LIFE (QOL) ASSESSMENT
RATE HOW BOTHERED YOU ARE BY SYMPTOMS OF YOUR SKIN PROBLEM (EG, ITCHING, STINGING BURNING, HURTING OR SKIN IRRITATION): 0 - NEVER BOTHERED
RATE HOW BOTHERED YOU ARE BY EFFECTS OF YOUR SKIN PROBLEMS ON YOUR ACTIVITIES (EG, GOING OUT, ACCOMPLISHING WHAT YOU WANT, WORK ACTIVITIES OR YOUR RELATIONSHIPS WITH OTHERS): 0 - NEVER BOTHERED
RATE HOW EMOTIONALLY BOTHERED YOU ARE BY YOUR SKIN PROBLEM (FOR EXAMPLE, WORRY, EMBARRASSMENT, FRUSTRATION): 0 - NEVER BOTHERED
RATE HOW EMOTIONALLY BOTHERED YOU ARE BY YOUR SKIN PROBLEM (FOR EXAMPLE, WORRY, EMBARRASSMENT, FRUSTRATION): 0 - NEVER BOTHERED
RATE HOW BOTHERED YOU ARE BY EFFECTS OF YOUR SKIN PROBLEMS ON YOUR ACTIVITIES (EG, GOING OUT, ACCOMPLISHING WHAT YOU WANT, WORK ACTIVITIES OR YOUR RELATIONSHIPS WITH OTHERS): 0 - NEVER BOTHERED
RATE HOW BOTHERED YOU ARE BY SYMPTOMS OF YOUR SKIN PROBLEM (EG, ITCHING, STINGING BURNING, HURTING OR SKIN IRRITATION): 0 - NEVER BOTHERED

## 2024-02-22 ENCOUNTER — OFFICE VISIT (OUTPATIENT)
Dept: DERMATOLOGY | Facility: CLINIC | Age: 49
End: 2024-02-22
Payer: COMMERCIAL

## 2024-02-22 DIAGNOSIS — D22.9 NEVUS: ICD-10-CM

## 2024-02-22 PROCEDURE — 1036F TOBACCO NON-USER: CPT | Performed by: SPECIALIST

## 2024-02-22 PROCEDURE — 99204 OFFICE O/P NEW MOD 45 MIN: CPT | Performed by: SPECIALIST

## 2024-02-22 PROCEDURE — 11400 EXC TR-EXT B9+MARG 0.5 CM<: CPT | Performed by: SPECIALIST

## 2024-02-22 PROCEDURE — 88305 TISSUE EXAM BY PATHOLOGIST: CPT | Performed by: DERMATOLOGY

## 2024-02-22 NOTE — PROGRESS NOTES
Subjective     Hermelinda Whelan is a 48 y.o. female who presents for the following: Body Exam.     Review of Systems:  No other skin or systemic complaints other than what is documented elsewhere in the note.    The following portions of the chart were reviewed this encounter and updated as appropriate:            Specialty Problems          Dermatology Problems    Skin lesion        Objective   Well appearing patient in no apparent distress; mood and affect are within normal limits.    A focused skin examination was performed. All findings within normal limits unless otherwise noted below.    Assessment/Plan   1. Nevus  Left Lower Back    Skin excision - Left Lower Back    Timeout: patient name, date of birth, surgical site, and procedure verified    Instrument used: #15 blade      Specimen 1 - Dermatopathology- DERM LAB  Differential Diagnosis: Dysplastic Nevus   Check Margins Yes/No?:    Comments:    Dermpath Lab: Routine Histopathology (formalin-fixed tissue)        Assessment/Plan: Patient has an irregularly pigmented bordered nevus left lower back in a photodamaged area.  Clinically this could be a dysplastic nevus.  No other lesions suspicious for malignancy.  Patient has mild-moderate photodamage.    Patient is here for a full body exam. Full body exam done in the presence of chaperone.     Eyes: Conjunctiva normal lids normal. Mouth and throat: Lips normal teeth normal gums normal.  Oropharynx is moist and normal.   Neck: Neck is supple without masses.   CV: Extremities are  normal without calf tenderness edema varicosities.   Abdomen: Is no hepatosplenomegaly.  Lymphatic: Is no cervical axillary or inguinal lymphadenopathy.   Extremities: Inspection palpation  of digits and nails is normal without clubbing or cyanosis.   Neuro/psych: Orientation to time,  place, person situation is normal.   Mood/affect is normal.   Skin: Palpation of scalp is normal  inspection of Hair and scalp, eyebrows, face, and  extremities normal. Inspection/palpation of  Head/face mild photo damage. Neck mild photo damage. Chest mild photo damage. Breasts are  normal axillary vaults are normal. Abdomen normal.   Genitalia normal groin normal, buttocks normal. Back mild photo damage. Right upper extremity photo damage. Left upper extremity photo damage. Right lower extremity photo damage. Left lower extremity normal. Inspection of eccrine apocrine glands of skin and subcutaneous tissues normal.

## 2024-02-26 LAB
LABORATORY COMMENT REPORT: NORMAL
PATH REPORT.FINAL DX SPEC: NORMAL
PATH REPORT.GROSS SPEC: NORMAL
PATH REPORT.MICROSCOPIC SPEC OTHER STN: NORMAL
PATH REPORT.RELEVANT HX SPEC: NORMAL
PATH REPORT.TOTAL CANCER: NORMAL

## 2024-02-28 ENCOUNTER — PATIENT MESSAGE (OUTPATIENT)
Dept: BEHAVIORAL HEALTH | Facility: CLINIC | Age: 49
End: 2024-02-28
Payer: COMMERCIAL

## 2024-02-28 DIAGNOSIS — F41.9 ANXIETY: ICD-10-CM

## 2024-02-28 RX ORDER — ALPRAZOLAM 0.25 MG/1
0.25 TABLET ORAL 2 TIMES DAILY PRN
Qty: 30 TABLET | Refills: 1 | Status: SHIPPED | OUTPATIENT
Start: 2024-02-28 | End: 2024-03-07 | Stop reason: DRUGHIGH

## 2024-02-29 ENCOUNTER — OFFICE VISIT (OUTPATIENT)
Dept: DERMATOLOGY | Facility: CLINIC | Age: 49
End: 2024-02-29
Payer: COMMERCIAL

## 2024-02-29 DIAGNOSIS — Z48.02 VISIT FOR SUTURE REMOVAL: ICD-10-CM

## 2024-02-29 PROCEDURE — 1036F TOBACCO NON-USER: CPT

## 2024-02-29 PROCEDURE — 99212 OFFICE O/P EST SF 10 MIN: CPT

## 2024-02-29 NOTE — PROGRESS NOTES
Subjective   HPI: Hermelinda Whelan is a 48 y.o. female is here for suture removal and biopsy results from 2/22/2024 Derm lab [id number].  No concerns at this time.    ROS: No other skin or systemic complaints other than what is documented elsewhere in the note.    ALLERGIES: Patient has no known allergies.    SOCIAL:  reports that she has never smoked. She has never used smokeless tobacco. She reports current alcohol use of about 5.0 standard drinks of alcohol per week. She reports that she does not use drugs.    Objective   Left Lower Back  [id number]     The area has healed nicely.        Assessment/Plan   1. Visit for suture removal  Left Lower Back    Discussed biopsy results from 2/22/2024 Derm lab [id number] which showed a mildly dysplastic nevus with clear margins.  No further work is needed.    Recommended yearly body examinations.         FOLLOW UP: 1 year full-body skin exam    The patient was encouraged to contact me with any further questions or concerns.  Megan Allen PA-C  2/29/2024

## 2024-03-06 ENCOUNTER — APPOINTMENT (OUTPATIENT)
Dept: PRIMARY CARE | Facility: CLINIC | Age: 49
End: 2024-03-06
Payer: COMMERCIAL

## 2024-03-07 ENCOUNTER — TELEMEDICINE (OUTPATIENT)
Dept: BEHAVIORAL HEALTH | Facility: CLINIC | Age: 49
End: 2024-03-07
Payer: COMMERCIAL

## 2024-03-07 DIAGNOSIS — F41.1 GAD (GENERALIZED ANXIETY DISORDER): ICD-10-CM

## 2024-03-07 DIAGNOSIS — F41.9 ANXIETY: ICD-10-CM

## 2024-03-07 PROCEDURE — 99213 OFFICE O/P EST LOW 20 MIN: CPT | Performed by: PSYCHIATRY & NEUROLOGY

## 2024-03-07 PROCEDURE — 1036F TOBACCO NON-USER: CPT | Performed by: PSYCHIATRY & NEUROLOGY

## 2024-03-07 RX ORDER — ALPRAZOLAM 0.25 MG/1
0.25 TABLET ORAL 2 TIMES DAILY PRN
Qty: 45 TABLET | Refills: 2 | Status: SHIPPED | OUTPATIENT
Start: 2024-03-07 | End: 2024-04-23 | Stop reason: SDUPTHER

## 2024-03-07 RX ORDER — BUSPIRONE HYDROCHLORIDE 15 MG/1
15 TABLET ORAL 2 TIMES DAILY
Qty: 60 TABLET | Refills: 11 | Status: SHIPPED | OUTPATIENT
Start: 2024-03-07 | End: 2025-03-07

## 2024-03-07 ASSESSMENT — ENCOUNTER SYMPTOMS: PSYCHIATRIC NEGATIVE: 1

## 2024-03-07 NOTE — PROGRESS NOTES
Subjective   Patient ID: Hermelinda Whelan is a 48 y.o. female who presents for medication management follow-up for anxiety and insomnia..  HPI patient seen interval psych history reviewed.  Patient reports coping fairly well with her sleep and anxiety despite the fact that there is some other people's in her schedule and household routine.  Also travel for work in the last week.  She has been taking anywhere from a half of a Xanax tablet in the evening anywhere up to 2 tablets total.  Please of the 0.25 mg so therefore never takes more than 0.5 mg at any 1 time.  Is using about 12-1/2 to 15 mg of Ambien at night for insomnia.  Talked about the future of meds like Xanax and Ambien she has been on Ambien for many many years for chronic severe insomnia.  Told her that Xanax is only a concern if it becomes long-term and in higher doses but it up total of 0.5 mg daily does not appear to be a concern at this point if we ever ran into higher doses of Xanax or Xanax tolerance we would probably want to detox her and then put her on something like mirtazapine or amitriptyline.    Review of Systems   Psychiatric/Behavioral: Negative.         Objective   Physical Exam  Psychiatric:         Attention and Perception: Attention and perception normal.         Mood and Affect: Mood and affect normal.         Speech: Speech normal.         Behavior: Behavior normal. Behavior is cooperative.         Thought Content: Thought content normal.         Cognition and Memory: Cognition and memory normal.         Judgment: Judgment normal.         Assessment/Plan   Problem List Items Addressed This Visit             ICD-10-CM    Anxiety F41.9     Continue current med regimen of Ambien 12-1/2 to 15 mg at night for chronic severe insomnia.  For prebedtime anxiety patient will take anywhere from 0.125 up to 0.5 mg of alprazolam nightly.  Dr. FARMER no issues.  Follow-up 3 months         Relevant Medications    ALPRAZolam (Xanax) 0.25 mg tablet      Other Visit Diagnoses         Codes    AB (generalized anxiety disorder)     F41.1    Relevant Medications    busPIRone (Buspar) 15 mg tablet                 Art Damian MD 03/07/24 11:51 AM

## 2024-03-07 NOTE — ASSESSMENT & PLAN NOTE
Continue current med regimen of Ambien 12-1/2 to 15 mg at night for chronic severe insomnia.  For prebedtime anxiety patient will take anywhere from 0.125 up to 0.5 mg of alprazolam nightly.  Dr. FARMER no issues.  Follow-up 3 months

## 2024-04-23 ENCOUNTER — PATIENT MESSAGE (OUTPATIENT)
Dept: BEHAVIORAL HEALTH | Facility: CLINIC | Age: 49
End: 2024-04-23
Payer: COMMERCIAL

## 2024-04-23 DIAGNOSIS — F41.9 ANXIETY: ICD-10-CM

## 2024-04-23 RX ORDER — ALPRAZOLAM 0.25 MG/1
0.25 TABLET ORAL 2 TIMES DAILY PRN
Qty: 45 TABLET | Refills: 2 | Status: SHIPPED | OUTPATIENT
Start: 2024-04-23

## 2024-06-06 ENCOUNTER — TELEMEDICINE (OUTPATIENT)
Dept: BEHAVIORAL HEALTH | Facility: CLINIC | Age: 49
End: 2024-06-06
Payer: COMMERCIAL

## 2024-06-06 DIAGNOSIS — F41.9 ANXIETY: ICD-10-CM

## 2024-06-06 PROCEDURE — 99213 OFFICE O/P EST LOW 20 MIN: CPT | Performed by: PSYCHIATRY & NEUROLOGY

## 2024-06-06 ASSESSMENT — PROMIS GLOBAL HEALTH SCALE
RATE_QUALITY_OF_LIFE: VERY GOOD
RATE_MENTAL_HEALTH: FAIR
RATE_AVERAGE_FATIGUE: MILD
RATE_AVERAGE_PAIN: 0
RATE_SOCIAL_SATISFACTION: GOOD
EMOTIONAL_PROBLEMS: SOMETIMES
CARRYOUT_PHYSICAL_ACTIVITIES: COMPLETELY
RATE_PHYSICAL_HEALTH: EXCELLENT
CARRYOUT_SOCIAL_ACTIVITIES: EXCELLENT
RATE_GENERAL_HEALTH: VERY GOOD

## 2024-06-06 ASSESSMENT — ENCOUNTER SYMPTOMS
NERVOUS/ANXIOUS: 1
DYSPHORIC MOOD: 0
SLEEP DISTURBANCE: 0

## 2024-06-06 NOTE — ASSESSMENT & PLAN NOTE
Try nac supplementation 600mg bid.continue buspar 15mg bid, xanx .25mg prn, ambien 10-15mg nightly. Fu 4 mos

## 2024-06-06 NOTE — PROGRESS NOTES
Subjective   Patient ID: Hermelinda Whelan is a 49 y.o. female who presents for medication management.  HPIPt seen interval psych hx reviewed, On average pt takes 10-15mg nightly ambien, ,25mg xanax, and 15mg buspar. Past 2 weeks have been fairly normal sleep and anxiety level.Over last 3 mos there have been higher anxiety times, no real depression episodes.No major bouts of insomnia during this time.    Review of Systems   Psychiatric/Behavioral:  Negative for dysphoric mood, sleep disturbance and suicidal ideas. The patient is nervous/anxious.        Objective   Physical Exam  Psychiatric:         Attention and Perception: Attention and perception normal.         Mood and Affect: Mood and affect normal.         Speech: Speech normal.         Behavior: Behavior normal. Behavior is cooperative.         Thought Content: Thought content normal.         Cognition and Memory: Cognition and memory normal.         Judgment: Judgment normal.         Assessment/Plan   Problem List Items Addressed This Visit             ICD-10-CM    Anxiety F41.9     Try nac supplementation 600mg bid.continue buspar 15mg bid, xanx .25mg prn, ambien 10-15mg nightly. Fu 4 mos                 Art Damian MD 06/06/24 12:46 PM

## 2024-06-07 ENCOUNTER — OFFICE VISIT (OUTPATIENT)
Dept: PRIMARY CARE | Facility: CLINIC | Age: 49
End: 2024-06-07
Payer: COMMERCIAL

## 2024-06-07 VITALS
WEIGHT: 141 LBS | DIASTOLIC BLOOD PRESSURE: 68 MMHG | OXYGEN SATURATION: 98 % | HEIGHT: 64 IN | BODY MASS INDEX: 24.07 KG/M2 | HEART RATE: 60 BPM | SYSTOLIC BLOOD PRESSURE: 99 MMHG

## 2024-06-07 DIAGNOSIS — Z00.00 ANNUAL PHYSICAL EXAM: Primary | ICD-10-CM

## 2024-06-07 DIAGNOSIS — R92.30 DENSE BREAST TISSUE: ICD-10-CM

## 2024-06-07 DIAGNOSIS — F41.9 ANXIETY: ICD-10-CM

## 2024-06-07 DIAGNOSIS — R92.333 HETEROGENEOUSLY DENSE TISSUE OF BOTH BREASTS ON MAMMOGRAPHY: ICD-10-CM

## 2024-06-07 DIAGNOSIS — Z80.3 FAMILY HISTORY OF BREAST CANCER: ICD-10-CM

## 2024-06-07 DIAGNOSIS — F51.04 CHRONIC INSOMNIA: ICD-10-CM

## 2024-06-07 DIAGNOSIS — Z12.31 ENCOUNTER FOR SCREENING MAMMOGRAM FOR MALIGNANT NEOPLASM OF BREAST: ICD-10-CM

## 2024-06-07 PROCEDURE — 99396 PREV VISIT EST AGE 40-64: CPT | Performed by: SPECIALIST

## 2024-06-07 NOTE — PROGRESS NOTES
Subjective   Patient ID: Hermelinda Whelan is a 49 y.o. female who presents for Annual Exam.  HPI    48 yo female Pmhx sig for Insomnia, Anxiety, Gestational DM (1 of her pregnancies) and Fam Hx Melanoma (Father), and Family history of breast cancer in Paternal Grandmother presents for annual exam    Seeing Psych 4/2023  Had terrible sleep issues and anxiety was bad end of year, saw Psych now on Buspar and titrated other medications.  Psych now doing refills  Feeling better than she did 6 mos ago (was terrible)  Thinks some may be hormonal   Up to 15 mg ambien HS, usually 10 mg  Buspar 15 mg bid.  Initially was dizzy on buspar but felt better and resting HR was a bit higher but has regulated now  Exercising regularly in am  Xanax prn needed some times   He also suggested NAC supplement (acetylcysteine) which she plans to start  Thinks she would rate her mental health as Fair to Good  Kids off school, work insane, mother visiting  Doesn't feel depressed, but some days some low mood, never suicidal  Sleeping better past 2 weeks    Still regular menses but a bit shorter duration missed one month in past  Typically feels the worst the week before and it feels permanent but when not in that time frame feels fine but knows that is not true  Wonder if ?Premenstrual dysmorphic syndrome and if Prozac 10 mg 2 weeks before menses would help?    Saw dermatology had a benign skin lesion removed           No Known Allergies   Current Outpatient Medications   Medication Instructions    ALPRAZolam (XANAX) 0.25 mg, oral, 2 times daily PRN    busPIRone (BUSPAR) 15 mg, oral, 2 times daily    UNABLE TO FIND Med Name:  N-Acetylcysteine    zolpidem (AMBIEN) 15 mg, oral, Nightly PRN        Review of Systems  Constitutional Some times fatigue, no fevers, no chills, no unintentional weight loss,   HEENT:  No headaches, no dizziness, no double vision, no blurred vision, eye exams last summer, no hearing  "loss  Cardiovascular:  No chest pain, no palpitations, no shortness of breath with exertion (one flight of stairs)  Respiratory:  No cough, no hemoptysis, no wheezing, No shortness of breath at rest no pain with inspiration  GI:  No dysphagia, no odynophagia, no reflux, no abdominal pain, no nausea, no vomiting, no changes in bowel habits, no bright red blood per rectum, no melena  :  No urinary frequency, no dysuria, no urine incontinence  MSK:  No falls, no joint pain, no joint swelling  Neuro:  No tremors, no extremity weakness, no changes in sensation  Breasts:  No abnormalities on self breast exam no nipple discharge no skin changes    Physical Exam  BP 99/68   Pulse 60   Ht 1.626 m (5' 4\")   Wt 64 kg (141 lb)   SpO2 98%   BMI 24.20 kg/m²   General:    Well-appearing  F in no acute distress, well nourished, well hydrated  Head:  Normocephalic, atraumatic  Skin:          Warm dry,   Eyes:  Anicteric sclera, pupils equal,   Ears:        TMs intact  Oral:      Not examined due to pandemic  Neck:   Supple, no cervical/supraclavicular adenopathy, no thyromegaly or nodules appreciated on exam  Cor:      Regular rate, normal S1, S2, no murmurs appreciated, no S3, no S4   Lungs:   Clear to auscultation b/l, no wheezes, no rhonchi, no crackles, no accessory respiratory muscle use  Abd:          Soft, nontender, no guarding, no rebound, no hepatosplenomegaly appreciated   Ext:            No lower extremity edema, no palpable cords  Pulses:      Pedal pulses intact  Neuro:   CN2-12 grossly intact (except funduscopic exam not performed)  Breasts:     No Axillary adenopathy, no discrete palpable breast nodules, no overlying skin changes, no nipple discharge    Assessment/Plan   Problem List Items Addressed This Visit       Anxiety     Management per psychiatry  On Buspar 15 mg bid and prn Xanax           Relevant Orders    Thyroid Stimulating Hormone    Thyroxine, Free    Chronic insomnia     Management per " Psychiatry  Now takes up to 15 mg ambien          Annual physical exam - Primary     Previously received annual influenza vaccine   Prior Covid vaccine in 2022, had Covid illness in 2/2024, plans vaccine in fall  Unclear date of prior Tdap, recommend Tdap every 10 yrs  Prior negative screen for Hepatitis C 11/16/22  Mammogram 11/17/2023, dense breast tissue, ordered  Colonoscopy 2/15/2023, repeat 10 yrs  Exercises regularly   Continue annual gynecology exams  Labs ordered CMP CBC Fx Lipids TSH Free T4         Relevant Orders    Thyroid Stimulating Hormone    Thyroxine, Free    CBC    Comprehensive Metabolic Panel    Lipid Panel    Dense breast tissue on mammogram     Mammogram 11/17/2023 with dense breast tissue  Family history of breast cancer in Paternal Grandmother  Discussed dense breast tissue may decrease the sensitivity of the mammogram  Discussed  offers self pay FAST MRI for women with dense breast tissue         Relevant Orders    BI mammo bilateral screening tomosynthesis    Family history of breast cancer     Family history of breast cancer in Paternal Grandmother         Encounter for screening mammogram for malignant neoplasm of breast     Mammogram 11/17/2023 with dense breast tissue  Family history of breast cancer in Paternal Grandmother         Relevant Orders    BI mammo bilateral screening tomosynthesis        Yvonne Arango DO

## 2024-06-20 PROBLEM — Z12.31 ENCOUNTER FOR SCREENING MAMMOGRAM FOR MALIGNANT NEOPLASM OF BREAST: Status: ACTIVE | Noted: 2024-06-20

## 2024-06-20 PROBLEM — Z80.3 FAMILY HISTORY OF BREAST CANCER: Status: ACTIVE | Noted: 2024-06-20

## 2024-06-20 NOTE — ASSESSMENT & PLAN NOTE
Previously received annual influenza vaccine   Prior Covid vaccine in 2022, had Covid illness in 2/2024, plans vaccine in fall  Unclear date of prior Tdap, recommend Tdap every 10 yrs  Prior negative screen for Hepatitis C 11/16/22  Mammogram 11/17/2023, dense breast tissue, ordered  Colonoscopy 2/15/2023, repeat 10 yrs  Exercises regularly   Continue annual gynecology exams  Labs ordered CMP CBC Fx Lipids TSH Free T4

## 2024-06-20 NOTE — ASSESSMENT & PLAN NOTE
Mammogram 11/17/2023 with dense breast tissue  Family history of breast cancer in Paternal Grandmother

## 2024-06-20 NOTE — ASSESSMENT & PLAN NOTE
Mammogram 11/17/2023 with dense breast tissue  Family history of breast cancer in Paternal Grandmother  Discussed dense breast tissue may decrease the sensitivity of the mammogram  Discussed  offers self pay FAST MRI for women with dense breast tissue

## 2024-06-28 DIAGNOSIS — F51.04 CHRONIC INSOMNIA: ICD-10-CM

## 2024-06-28 DIAGNOSIS — F41.9 ANXIETY: ICD-10-CM

## 2024-06-28 RX ORDER — ALPRAZOLAM 0.25 MG/1
0.25 TABLET ORAL 2 TIMES DAILY PRN
Qty: 45 TABLET | Refills: 2 | Status: SHIPPED | OUTPATIENT
Start: 2024-06-28

## 2024-06-28 RX ORDER — ZOLPIDEM TARTRATE 10 MG/1
TABLET ORAL
Qty: 45 TABLET | Refills: 2 | Status: SHIPPED | OUTPATIENT
Start: 2024-06-28

## 2024-08-20 DIAGNOSIS — F41.9 ANXIETY: ICD-10-CM

## 2024-08-22 RX ORDER — ALPRAZOLAM 0.25 MG/1
0.25 TABLET ORAL 2 TIMES DAILY PRN
Qty: 30 TABLET | Refills: 1 | Status: SHIPPED | OUTPATIENT
Start: 2024-08-22

## 2024-09-25 ENCOUNTER — PATIENT MESSAGE (OUTPATIENT)
Dept: BEHAVIORAL HEALTH | Facility: CLINIC | Age: 49
End: 2024-09-25
Payer: COMMERCIAL

## 2024-09-25 DIAGNOSIS — F51.04 CHRONIC INSOMNIA: ICD-10-CM

## 2024-09-25 RX ORDER — ZOLPIDEM TARTRATE 10 MG/1
15 TABLET ORAL NIGHTLY PRN
Qty: 45 TABLET | Refills: 2 | Status: SHIPPED | OUTPATIENT
Start: 2024-09-25 | End: 2024-12-24

## 2024-10-10 ENCOUNTER — APPOINTMENT (OUTPATIENT)
Dept: BEHAVIORAL HEALTH | Facility: CLINIC | Age: 49
End: 2024-10-10
Payer: COMMERCIAL

## 2024-11-08 ENCOUNTER — APPOINTMENT (OUTPATIENT)
Dept: BEHAVIORAL HEALTH | Facility: CLINIC | Age: 49
End: 2024-11-08
Payer: COMMERCIAL

## 2024-11-25 ENCOUNTER — APPOINTMENT (OUTPATIENT)
Dept: RADIOLOGY | Facility: CLINIC | Age: 49
End: 2024-11-25
Payer: COMMERCIAL

## 2024-12-03 ENCOUNTER — OFFICE VISIT (OUTPATIENT)
Dept: URGENT CARE | Age: 49
End: 2024-12-03
Payer: COMMERCIAL

## 2024-12-03 VITALS
RESPIRATION RATE: 18 BRPM | TEMPERATURE: 98.6 F | DIASTOLIC BLOOD PRESSURE: 64 MMHG | SYSTOLIC BLOOD PRESSURE: 96 MMHG | OXYGEN SATURATION: 98 % | HEART RATE: 66 BPM

## 2024-12-03 DIAGNOSIS — J01.00 ACUTE MAXILLARY SINUSITIS, RECURRENCE NOT SPECIFIED: ICD-10-CM

## 2024-12-03 DIAGNOSIS — Z20.89 EXPOSURE TO PNEUMONIA: Primary | ICD-10-CM

## 2024-12-03 PROCEDURE — 99203 OFFICE O/P NEW LOW 30 MIN: CPT | Performed by: PHYSICIAN ASSISTANT

## 2024-12-03 PROCEDURE — 1036F TOBACCO NON-USER: CPT | Performed by: PHYSICIAN ASSISTANT

## 2024-12-03 RX ORDER — AZITHROMYCIN 250 MG/1
TABLET, FILM COATED ORAL
Qty: 6 TABLET | Refills: 0 | Status: SHIPPED | OUTPATIENT
Start: 2024-12-03

## 2024-12-03 RX ORDER — AMOXICILLIN AND CLAVULANATE POTASSIUM 875; 125 MG/1; MG/1
875 TABLET, FILM COATED ORAL 2 TIMES DAILY
Qty: 20 TABLET | Refills: 0 | Status: SHIPPED | OUTPATIENT
Start: 2024-12-03

## 2024-12-03 ASSESSMENT — ENCOUNTER SYMPTOMS
WHEEZING: 1
COUGH: 1

## 2024-12-03 NOTE — PROGRESS NOTES
Subjective   Patient ID: Hermelinda Whelan is a 49 y.o. female. They present today with a chief complaint of Nasal Congestion (6-7 days), sinus pressure, Cough, and Wheezing.    History of Present Illness  This is a 49-year-old female who presents to the urgent care with complaints of a cough, sinus pressure and nasal congestion.  Patient states she did have a fever few days ago that has resolved.  Patient has also been exposed to walking pneumonia.  Patient states her cough is productive.  Patient states no improvement with over-the-counter medications.  Patient denies any other systemic complaints at this time.      Cough  Associated symptoms include wheezing.   Wheezing  Associated symptoms: cough        Past Medical History  Allergies as of 12/03/2024    (No Known Allergies)       (Not in a hospital admission)       Past Medical History:   Diagnosis Date    COVID-19     COVID-19    Depression PPD after birth of first child 12/08    Encounter for full-term uncomplicated delivery     Spontaneous vaginal delivery    Fear of flying     Anxiety with flying    Gestational diabetes 9/08 (mild, and did not have with subsequent pgcy)    Personal history of gestational diabetes     History of gestational diabetes mellitus (GDM)       Past Surgical History:   Procedure Laterality Date    OTHER SURGICAL HISTORY  07/16/2020    No history of surgery        reports that she has never smoked. She has never used smokeless tobacco. She reports current alcohol use of about 3.0 standard drinks of alcohol per week. She reports that she does not use drugs.    Review of Systems  Review of Systems   Respiratory:  Positive for cough and wheezing.    All other systems reviewed and are negative.                                 Objective    Vitals:    12/03/24 0813   BP: 96/64   Pulse: 66   Resp: 18   Temp: 37 °C (98.6 °F)   SpO2: 98%     No LMP recorded.    Physical Exam  Vitals and nursing note reviewed.   Constitutional:        Appearance: Normal appearance.   HENT:      Head: Normocephalic and atraumatic.      Right Ear: Ear canal and external ear normal.      Left Ear: Ear canal and external ear normal.      Ears:      Comments: Middle ear fluid bilaterally     Nose: Nose normal.      Mouth/Throat:      Mouth: Mucous membranes are moist.      Pharynx: Oropharynx is clear.   Eyes:      Extraocular Movements: Extraocular movements intact.      Conjunctiva/sclera: Conjunctivae normal.   Cardiovascular:      Rate and Rhythm: Normal rate and regular rhythm.   Pulmonary:      Effort: Pulmonary effort is normal. No respiratory distress.      Breath sounds: No stridor. Rales present.   Skin:     General: Skin is warm and dry.   Neurological:      General: No focal deficit present.      Mental Status: She is alert and oriented to person, place, and time.   Psychiatric:         Mood and Affect: Mood normal.         Behavior: Behavior normal.         Procedures    Point of Care Test & Imaging Results from this visit  No results found for this visit on 12/03/24.   No results found.    Diagnostic study results (if any) were reviewed by Juana Peraza PA-C.    Assessment/Plan   Allergies, medications, history, and pertinent labs/EKGs/Imaging reviewed by Juana Peraza PA-C.     Medical Decision Making  Sinusitis/exposure pneumonia-patient will be covered with dual antibiotics to cover for both pneumonia and sinusitis.  Symptoms have been going on for greater than 1 week.  No improvement with over-the-counter medication.  Patient did have rales on lung examination.  Patient is stable nontoxic-appearing no acute distress.  No hypoxia.    Orders and Diagnoses  There are no diagnoses linked to this encounter.    Medical Admin Record      Patient disposition: Home    Electronically signed by Juana Peraza PA-C  8:23 AM

## 2024-12-03 NOTE — PATIENT INSTRUCTIONS
Take oral antibiotics as directed, Care instructions/red flags, return precautions & ADEs discussed when to seek medical attention in clinic vs ER and pt agreed/understood. Follow up with primary care provider in 3-5 days if no improvement.

## 2024-12-04 DIAGNOSIS — F51.04 CHRONIC INSOMNIA: ICD-10-CM

## 2024-12-04 DIAGNOSIS — F41.9 ANXIETY: ICD-10-CM

## 2024-12-04 RX ORDER — ALPRAZOLAM 0.25 MG/1
0.25 TABLET ORAL 2 TIMES DAILY PRN
Qty: 30 TABLET | Refills: 1 | Status: SHIPPED | OUTPATIENT
Start: 2024-12-04 | End: 2024-12-19

## 2024-12-04 RX ORDER — ZOLPIDEM TARTRATE 10 MG/1
15 TABLET ORAL NIGHTLY PRN
Qty: 45 TABLET | Refills: 2 | Status: CANCELLED | OUTPATIENT
Start: 2024-12-04 | End: 2025-03-04

## 2024-12-04 RX ORDER — ZOLPIDEM TARTRATE 10 MG/1
15 TABLET ORAL NIGHTLY PRN
Qty: 45 TABLET | Refills: 1 | Status: SHIPPED | OUTPATIENT
Start: 2024-12-04 | End: 2025-02-02

## 2024-12-04 RX ORDER — ALPRAZOLAM 0.25 MG/1
0.25 TABLET ORAL 2 TIMES DAILY PRN
Qty: 30 TABLET | Refills: 1 | Status: CANCELLED | OUTPATIENT
Start: 2024-12-04

## 2024-12-19 ENCOUNTER — HOSPITAL ENCOUNTER (OUTPATIENT)
Dept: RADIOLOGY | Facility: CLINIC | Age: 49
Discharge: HOME | End: 2024-12-19
Payer: COMMERCIAL

## 2024-12-19 VITALS — WEIGHT: 140 LBS | HEIGHT: 64 IN | BODY MASS INDEX: 23.9 KG/M2

## 2024-12-19 DIAGNOSIS — Z12.31 ENCOUNTER FOR SCREENING MAMMOGRAM FOR MALIGNANT NEOPLASM OF BREAST: ICD-10-CM

## 2024-12-19 DIAGNOSIS — R92.333 HETEROGENEOUSLY DENSE TISSUE OF BOTH BREASTS ON MAMMOGRAPHY: ICD-10-CM

## 2024-12-19 PROCEDURE — 77067 SCR MAMMO BI INCL CAD: CPT

## 2025-01-08 ENCOUNTER — APPOINTMENT (OUTPATIENT)
Dept: BEHAVIORAL HEALTH | Facility: CLINIC | Age: 50
End: 2025-01-08
Payer: COMMERCIAL

## 2025-01-08 DIAGNOSIS — F32.81 PMDD (PREMENSTRUAL DYSPHORIC DISORDER): ICD-10-CM

## 2025-01-08 DIAGNOSIS — F51.04 CHRONIC INSOMNIA: ICD-10-CM

## 2025-01-08 DIAGNOSIS — F41.9 ANXIETY: ICD-10-CM

## 2025-01-08 DIAGNOSIS — F41.1 GAD (GENERALIZED ANXIETY DISORDER): ICD-10-CM

## 2025-01-08 RX ORDER — ALPRAZOLAM 0.25 MG/1
0.25 TABLET ORAL 2 TIMES DAILY PRN
Qty: 45 TABLET | Refills: 2 | Status: SHIPPED | OUTPATIENT
Start: 2025-01-08

## 2025-01-08 RX ORDER — FLUOXETINE HYDROCHLORIDE 20 MG/1
20 CAPSULE ORAL DAILY
Qty: 30 CAPSULE | Refills: 11 | Status: SHIPPED | OUTPATIENT
Start: 2025-01-08 | End: 2026-01-08

## 2025-01-08 RX ORDER — ZOLPIDEM TARTRATE 10 MG/1
15 TABLET ORAL NIGHTLY
Qty: 45 TABLET | Refills: 1 | Status: SHIPPED | OUTPATIENT
Start: 2025-01-08 | End: 2025-03-09

## 2025-01-08 NOTE — PROGRESS NOTES
Subjective   Patient ID: Hermelinda Whelan is a 49 y.o. female who presents for PMDD and insomnia.     Virtual or Telephone Consent    An interactive audio and video telecommunication system which permits real time communications between the patient (at the originating site) and provider (at the distant site) was utilized to provide this telehealth service.   Verbal consent was requested and obtained from Hermelinda Whelan on this date, 01/08/25 for a telehealth visit.      HPI  She states she has battled with insomnia since childhood despite sleep hygiene such as sleep yoga, aromatherapy, reading, white noise,showering at night, weighted blankets and a weighted eye mask. Sleep specialist testing was negative.  She has been prescribed Ambien 10 mg for 20 yrs. When Ambien 10 mg ceased working  added buspirone 15 mg BID, alprazolam 0.25 mg and increased  Ambien to 15 mg which she has found to be effective.  Winter of 2023 she found herself becoming more anxious as the even approaches. During her luteal phase she feels more anxious, dreadful, and foggy.   Denies thoughts of Self-harm and SI/HI/AVH.    Past medications:  Sertraline 50 mg-ineffective  Review of Systems   All other systems reviewed and are negative.        Objective   Physical Exam  Psychiatric:         Attention and Perception: Attention normal.         Mood and Affect: Mood normal.         Speech: Speech normal.         Behavior: Behavior is cooperative.         Thought Content: Thought content normal.         Cognition and Memory: Cognition normal.         Judgment: Judgment normal.     Reviewed OARRS, no discrepancies or concerns. Discussed risk of motor/cognitive impairment, sedation, dependence, tolerance, abuse, withdrawal sequelae, accidental overdose, life-threatening respiratory depression (yu. in combination with alcohol and/or opioids), excess sedation in combination with other sedating medicines.   Acute risk of self-harm  remains low despite current stressors (acute and chronic). No reported thoughts of self-harm plan, or intent. She is future-oriented, feels responsibility for her family, and has no hx of SA or hospitalizations.  Assessment/Plan   Initiate fluoxetine 20 mg  to target PMDD.  Continue alprazolam 0.25 mg to target anxiety.  Continue buspirone 15 mg BID to target anxiety.  Continue zolpidem 15 mg to target insomnia.  Utilize the website Psychology Today to find a therapist.   Provided with crisis/emergency resources, including Mobile Crisis [phone], Crisis Text Line (text 3MRRE kf [id number]) and the National Suicide Prevention Lifeline hotline 1-386.776.8954. Agrees to call 911 or go to the nearest emergency department if s/he feels unsafe, or has suicidal thinking with a plan or intent.   Advised to call [phone] to report any urgent concerns and/or schedule a sooner follow-up.   Next appointment:3/5 at 9:30

## 2025-02-24 DIAGNOSIS — F41.1 GAD (GENERALIZED ANXIETY DISORDER): ICD-10-CM

## 2025-02-24 RX ORDER — BUSPIRONE HYDROCHLORIDE 15 MG/1
15 TABLET ORAL 2 TIMES DAILY
Qty: 180 TABLET | Refills: 0 | Status: SHIPPED | OUTPATIENT
Start: 2025-02-24 | End: 2026-02-24

## 2025-02-24 RX ORDER — BUSPIRONE HYDROCHLORIDE 15 MG/1
15 TABLET ORAL 2 TIMES DAILY
Qty: 60 TABLET | Refills: 11 | OUTPATIENT
Start: 2025-02-24 | End: 2026-02-24

## 2025-03-05 ENCOUNTER — APPOINTMENT (OUTPATIENT)
Dept: BEHAVIORAL HEALTH | Facility: CLINIC | Age: 50
End: 2025-03-05
Payer: COMMERCIAL

## 2025-03-17 DIAGNOSIS — F41.9 ANXIETY: ICD-10-CM

## 2025-03-17 DIAGNOSIS — F51.04 CHRONIC INSOMNIA: ICD-10-CM

## 2025-03-17 RX ORDER — ZOLPIDEM TARTRATE 10 MG/1
15 TABLET ORAL NIGHTLY
Qty: 45 TABLET | Refills: 1 | Status: SHIPPED | OUTPATIENT
Start: 2025-03-17 | End: 2025-05-16

## 2025-03-17 RX ORDER — ALPRAZOLAM 0.25 MG/1
0.25 TABLET ORAL 2 TIMES DAILY PRN
Qty: 30 TABLET | Refills: 0 | Status: SHIPPED | OUTPATIENT
Start: 2025-03-17 | End: 2025-04-16

## 2025-04-02 ASSESSMENT — PATIENT GLOBAL ASSESSMENT (PGA): WHAT IS THE PGA: PATIENT GLOBAL ASSESSMENT:  2 - MILD

## 2025-04-02 ASSESSMENT — DERMATOLOGY QUALITY OF LIFE (QOL) ASSESSMENT
RATE HOW BOTHERED YOU ARE BY EFFECTS OF YOUR SKIN PROBLEMS ON YOUR ACTIVITIES (EG, GOING OUT, ACCOMPLISHING WHAT YOU WANT, WORK ACTIVITIES OR YOUR RELATIONSHIPS WITH OTHERS): 0 - NEVER BOTHERED
RATE HOW BOTHERED YOU ARE BY SYMPTOMS OF YOUR SKIN PROBLEM (EG, ITCHING, STINGING BURNING, HURTING OR SKIN IRRITATION): 1
RATE HOW BOTHERED YOU ARE BY EFFECTS OF YOUR SKIN PROBLEMS ON YOUR ACTIVITIES (EG, GOING OUT, ACCOMPLISHING WHAT YOU WANT, WORK ACTIVITIES OR YOUR RELATIONSHIPS WITH OTHERS): 0 - NEVER BOTHERED
RATE HOW BOTHERED YOU ARE BY SYMPTOMS OF YOUR SKIN PROBLEM (EG, ITCHING, STINGING BURNING, HURTING OR SKIN IRRITATION): 1
RATE HOW EMOTIONALLY BOTHERED YOU ARE BY YOUR SKIN PROBLEM (FOR EXAMPLE, WORRY, EMBARRASSMENT, FRUSTRATION): 0 - NEVER BOTHERED
RATE HOW EMOTIONALLY BOTHERED YOU ARE BY YOUR SKIN PROBLEM (FOR EXAMPLE, WORRY, EMBARRASSMENT, FRUSTRATION): 0 - NEVER BOTHERED
WHAT SINGLE SKIN CONDITION LISTED BELOW IS THE PATIENT ANSWERING THE QUALITY-OF-LIFE ASSESSMENT QUESTIONS ABOUT: NONE OF THE ABOVE
WHAT SINGLE SKIN CONDITION LISTED BELOW IS THE PATIENT ANSWERING THE QUALITY-OF-LIFE ASSESSMENT QUESTIONS ABOUT: NONE OF THE ABOVE

## 2025-04-03 ENCOUNTER — APPOINTMENT (OUTPATIENT)
Dept: DERMATOLOGY | Facility: CLINIC | Age: 50
End: 2025-04-03
Payer: COMMERCIAL

## 2025-04-03 DIAGNOSIS — Z12.83 SCREENING EXAM FOR SKIN CANCER: ICD-10-CM

## 2025-04-03 DIAGNOSIS — D22.9 MULTIPLE BENIGN NEVI: Primary | ICD-10-CM

## 2025-04-03 DIAGNOSIS — L82.1 SEBORRHEIC KERATOSIS: ICD-10-CM

## 2025-04-03 DIAGNOSIS — L81.4 LENTIGO: ICD-10-CM

## 2025-04-03 DIAGNOSIS — Z86.018 HISTORY OF DYSPLASTIC NEVUS: ICD-10-CM

## 2025-04-03 DIAGNOSIS — L57.8 ACTINIC SKIN DAMAGE: ICD-10-CM

## 2025-04-03 DIAGNOSIS — L55.9 SUNBURN: ICD-10-CM

## 2025-04-03 PROCEDURE — 99213 OFFICE O/P EST LOW 20 MIN: CPT | Performed by: DERMATOLOGY

## 2025-04-03 PROCEDURE — 1036F TOBACCO NON-USER: CPT | Performed by: DERMATOLOGY

## 2025-04-03 ASSESSMENT — DERMATOLOGY PATIENT ASSESSMENT
ARE YOU ON BIRTH CONTROL: NO
HAVE YOU HAD OR DO YOU HAVE A STAPH INFECTION: NO
HAVE YOU HAD OR DO YOU HAVE VASCULAR DISEASE: NO
DO YOU USE SUNSCREEN: DAILY
ARE YOU AN ORGAN TRANSPLANT RECIPIENT: NO
DO YOU HAVE IRREGULAR MENSTRUAL CYCLES: NO
ARE YOU TRYING TO GET PREGNANT: NO
DO YOU USE A TANNING BED: NO

## 2025-04-03 ASSESSMENT — DERMATOLOGY QUALITY OF LIFE (QOL) ASSESSMENT
DATE THE QUALITY-OF-LIFE ASSESSMENT WAS COMPLETED: 67298
RATE HOW BOTHERED YOU ARE BY EFFECTS OF YOUR SKIN PROBLEMS ON YOUR ACTIVITIES (EG, GOING OUT, ACCOMPLISHING WHAT YOU WANT, WORK ACTIVITIES OR YOUR RELATIONSHIPS WITH OTHERS): 0 - NEVER BOTHERED
RATE HOW BOTHERED YOU ARE BY SYMPTOMS OF YOUR SKIN PROBLEM (EG, ITCHING, STINGING BURNING, HURTING OR SKIN IRRITATION): 0 - NEVER BOTHERED
ARE THERE EXCLUSIONS OR EXCEPTIONS FOR THE QUALITY OF LIFE ASSESSMENT: NO
RATE HOW EMOTIONALLY BOTHERED YOU ARE BY YOUR SKIN PROBLEM (FOR EXAMPLE, WORRY, EMBARRASSMENT, FRUSTRATION): 0 - NEVER BOTHERED

## 2025-04-03 ASSESSMENT — PATIENT GLOBAL ASSESSMENT (PGA): PATIENT GLOBAL ASSESSMENT: PATIENT GLOBAL ASSESSMENT:  1 - CLEAR

## 2025-04-03 NOTE — PROGRESS NOTES
Subjective     Hermelinda Whelan is a 50 y.o. female who presents for the following: Skin Check (Right shoulder). Last derm visit 2/29/24 for suture removal with Vinod Allen PA-C for mildly dysplastic nevus that was excised from left lower back 2/22/24    Review of Systems:  No other skin or systemic complaints other than what is documented elsewhere in the note.    The following portions of the chart were reviewed this encounter and updated as appropriate:  Tobacco  Allergies  Meds  Problems  Med Hx  Surg Hx         Skin Cancer History  No skin cancer on file.      Specialty Problems          Dermatology Problems    Skin lesion        Objective   Well appearing patient in no apparent distress; mood and affect are within normal limits.    A full examination was performed including scalp, head, eyes, ears, nose, lips, neck, chest, axillae, abdomen, back, buttocks, bilateral upper extremities, bilateral lower extremities, hands, feet, fingers, toes, fingernails, and toenails. All findings within normal limits unless otherwise noted below.    Assessment/Plan   1. Multiple benign nevi  Brown and tan macules and papules with reassuring findings on dermoscopy    -These lesions have benign, reassuring patterns on dermoscopy  -Recommend continued self observation, and to contact the office if any changes in nevi are noticed    2. Lentigo  Tan macules    -Benign appearing on exam  -Reassurance, recommend observation    3. Seborrheic keratosis  Stuck on, waxy macule(s)/papule(s)/plaque(s) with comedo-like openings and milia like cysts    -Discussed the nature of the diagnosis  -Reassurance, recommend continued observation    4. Actinic skin damage  Background of photodamage with hyper- and hypo-pigmented macules on the skin    5. History of dysplastic nevus  - mildly dysplastic nevus that was excised from left lower back 2/22/24    6. Screening exam for skin cancer    Full body skin exam  -No lesions concerning for  malignancy on the remainder the skin exam today   - The ugly duckling sign was discussed. Monitor for any skin lesions that are different in color, shape, or size than others on body  -Sun protection was discussed. Recommend SPF 30+, hats with brims, sun protective clothing, and avoiding sun exposure between 10 AM and 2 PM whenever possible  -Recommend regular skin exams or sooner if new or changing lesions       Related Procedures  Follow Up In Dermatology - Established Patient    7. Sunburn  Left Breast, Right Foot - Anterior  Pink patches with peeling    Missed two areas with sunscreen, usually very diligent    Reviewed sunscreen        Follow up 1-2 years Full Skin Exam

## 2025-04-28 ENCOUNTER — APPOINTMENT (OUTPATIENT)
Dept: OBSTETRICS AND GYNECOLOGY | Facility: CLINIC | Age: 50
End: 2025-04-28
Payer: COMMERCIAL

## 2025-04-28 VITALS
DIASTOLIC BLOOD PRESSURE: 64 MMHG | BODY MASS INDEX: 23.39 KG/M2 | SYSTOLIC BLOOD PRESSURE: 92 MMHG | HEIGHT: 64 IN | WEIGHT: 137 LBS

## 2025-04-28 DIAGNOSIS — N95.1 MENOPAUSAL SYNDROME ON HORMONE REPLACEMENT THERAPY: ICD-10-CM

## 2025-04-28 DIAGNOSIS — N95.1 HOT FLASH, MENOPAUSAL: ICD-10-CM

## 2025-04-28 DIAGNOSIS — Z12.4 CERVICAL CANCER SCREENING: ICD-10-CM

## 2025-04-28 DIAGNOSIS — G47.09 OTHER INSOMNIA: ICD-10-CM

## 2025-04-28 DIAGNOSIS — Z01.419 WELL WOMAN EXAM WITH ROUTINE GYNECOLOGICAL EXAM: Primary | ICD-10-CM

## 2025-04-28 DIAGNOSIS — N95.1 PERIMENOPAUSE: ICD-10-CM

## 2025-04-28 DIAGNOSIS — Z79.890 MENOPAUSAL SYNDROME ON HORMONE REPLACEMENT THERAPY: ICD-10-CM

## 2025-04-28 PROCEDURE — 1036F TOBACCO NON-USER: CPT | Performed by: ADVANCED PRACTICE MIDWIFE

## 2025-04-28 PROCEDURE — 88175 CYTOPATH C/V AUTO FLUID REDO: CPT

## 2025-04-28 PROCEDURE — 99396 PREV VISIT EST AGE 40-64: CPT | Performed by: ADVANCED PRACTICE MIDWIFE

## 2025-04-28 PROCEDURE — 3008F BODY MASS INDEX DOCD: CPT | Performed by: ADVANCED PRACTICE MIDWIFE

## 2025-04-28 PROCEDURE — 87626 HPV SEP HI-RSK TYP&POOL RSLT: CPT

## 2025-04-28 RX ORDER — PROGESTERONE 100 MG/1
100 CAPSULE ORAL NIGHTLY
Qty: 90 CAPSULE | Refills: 3 | Status: SHIPPED | OUTPATIENT
Start: 2025-04-28 | End: 2026-04-28

## 2025-04-28 RX ORDER — ESTRADIOL 0.5 MG/1
0.5 TABLET ORAL DAILY
Qty: 30 TABLET | Refills: 1 | Status: SHIPPED | OUTPATIENT
Start: 2025-04-28 | End: 2026-04-28

## 2025-04-28 ASSESSMENT — ENCOUNTER SYMPTOMS
NEUROLOGICAL NEGATIVE: 0
ALLERGIC/IMMUNOLOGIC NEGATIVE: 0
EYES NEGATIVE: 0
HEMATOLOGIC/LYMPHATIC NEGATIVE: 0
GASTROINTESTINAL NEGATIVE: 0
CARDIOVASCULAR NEGATIVE: 0
ENDOCRINE NEGATIVE: 0
RESPIRATORY NEGATIVE: 0
CONSTITUTIONAL NEGATIVE: 0
PSYCHIATRIC NEGATIVE: 0
MUSCULOSKELETAL NEGATIVE: 0

## 2025-04-28 ASSESSMENT — PAIN SCALES - GENERAL: PAINLEVEL_OUTOF10: 0-NO PAIN

## 2025-04-28 NOTE — PROGRESS NOTES
"Subjective   Hermelinda Whelan is a 50 y.o. female who is here for Annual Exam (Wants to discuss menopausal symptoms /Last PAP=???//Last MAMMO=2024//Last Colonoscopy= 2/15/2023//Montserrat SABILLON MA, II/).     Concerns today:  LMP 2025- every other month    Patient is perimenopausal. Periods are rare, lasting 3 days.   Experiencing menopause symptoms? VMS, Mood changes, and Sleep problems  The patient is not taking hormone therapy.   Any Contraindications to HT:  none     Sexual Activity: sexually active, male partners; Patient reports 1 partners in the last 12 months.  Pain with intercourse? No   Loss of desire? Yes   Able to have an orgasm? Yes     History of prior STI: none  Desires STI screening? No    Current contraception: none    Last pap:   History of abnormal Pap smear: no  Family history of uterine or ovarian cancer: no    Last mammogram:   History of abnormal mammogram: no  Family history of breast cancer: yes - fathers mother   OB History    Para Term  AB Living   2 2 2 0 0 2   SAB IAB Ectopic Multiple Live Births   0 0 0 0 0      Objective   BP 92/64   Ht 1.626 m (5' 4\")   Wt 62.1 kg (137 lb)    Physical Exam  Vitals reviewed.   Constitutional:       General: She is not in acute distress.     Appearance: Normal appearance.   HENT:      Head: Normocephalic.   Eyes:      Pupils: Pupils are equal, round, and reactive to light.   Cardiovascular:      Rate and Rhythm: Normal rate and regular rhythm.      Heart sounds: No murmur heard.     No gallop.   Pulmonary:      Effort: Pulmonary effort is normal. No respiratory distress.      Breath sounds: Normal breath sounds. No stridor. No wheezing, rhonchi or rales.   Chest:      Chest wall: No tenderness or crepitus.   Breasts:     Right: No inverted nipple, mass, nipple discharge, skin change or tenderness.      Left: Normal. No inverted nipple, mass, nipple discharge, skin change or tenderness.   Abdominal:      General: Abdomen is " flat.      Palpations: Abdomen is soft. There is no mass.      Tenderness: There is no abdominal tenderness. There is no right CVA tenderness, left CVA tenderness or rebound.      Hernia: No hernia is present.   Genitourinary:     General: Normal vulva.      Pubic Area: No rash.       Labia:         Right: No rash, tenderness, lesion or injury.         Left: No rash, tenderness, lesion or injury.       Urethra: No prolapse or urethral swelling.      Vagina: Normal. No foreign body. No erythema, tenderness, bleeding, lesions or prolapsed vaginal walls.      Cervix: No cervical motion tenderness, friability or lesion.      Uterus: Normal. Not enlarged, not tender and no uterine prolapse.       Adnexa: Right adnexa normal and left adnexa normal.        Right: No mass or tenderness.          Left: No mass or tenderness.        Rectum: Normal.      Comments: EGBUS WNL   Musculoskeletal:      Cervical back: Neck supple. No rigidity or tenderness.   Skin:     General: Skin is warm and dry.   Neurological:      Mental Status: She is alert.   Psychiatric:         Mood and Affect: Mood normal.         Behavior: Behavior normal.         Thought Content: Thought content normal.         Judgment: Judgment normal.          Assessment/Plan   Problem List Items Addressed This Visit    None  Visit Diagnoses         Codes      Well woman exam with routine gynecological exam    -  Primary Z01.419      Cervical cancer screening     Z12.4    Relevant Orders    THINPREP PAP TEST      Perimenopause     N95.1    Relevant Medications    estradiol (Estrace) 0.5 mg tablet    progesterone (Prometrium) 100 mg capsule      Hot flash, menopausal     N95.1    Relevant Medications    estradiol (Estrace) 0.5 mg tablet    progesterone (Prometrium) 100 mg capsule      Other insomnia     G47.09    Relevant Medications    estradiol (Estrace) 0.5 mg tablet    progesterone (Prometrium) 100 mg capsule      Menopausal syndrome on hormone replacement therapy      N95.1, Z79.890    Relevant Medications    estradiol (Estrace) 0.5 mg tablet    progesterone (Prometrium) 100 mg capsule          No follow-ups on file.  Shelby Noe, SKYE-CORNELIAM

## 2025-05-15 ENCOUNTER — TELEMEDICINE (OUTPATIENT)
Dept: BEHAVIORAL HEALTH | Facility: CLINIC | Age: 50
End: 2025-05-15
Payer: COMMERCIAL

## 2025-05-15 DIAGNOSIS — F41.1 GAD (GENERALIZED ANXIETY DISORDER): ICD-10-CM

## 2025-05-15 DIAGNOSIS — F51.04 CHRONIC INSOMNIA: ICD-10-CM

## 2025-05-15 DIAGNOSIS — F41.9 ANXIETY: ICD-10-CM

## 2025-05-15 PROCEDURE — 99213 OFFICE O/P EST LOW 20 MIN: CPT

## 2025-05-15 RX ORDER — ALPRAZOLAM 0.25 MG/1
0.25 TABLET ORAL 2 TIMES DAILY PRN
Qty: 30 TABLET | Refills: 0 | Status: SHIPPED | OUTPATIENT
Start: 2025-05-15 | End: 2025-06-14

## 2025-05-15 RX ORDER — ZOLPIDEM TARTRATE 10 MG/1
15 TABLET ORAL NIGHTLY
Qty: 45 TABLET | Refills: 1 | Status: SHIPPED | OUTPATIENT
Start: 2025-05-15 | End: 2025-07-14

## 2025-05-15 RX ORDER — BUSPIRONE HYDROCHLORIDE 15 MG/1
15 TABLET ORAL 2 TIMES DAILY
Qty: 180 TABLET | Refills: 0 | Status: SHIPPED | OUTPATIENT
Start: 2025-05-15 | End: 2026-05-15

## 2025-05-15 NOTE — PROGRESS NOTES
Subjective   Patient ID: Hermelinda Whelan is a 50 y.o. female who presents for PMDD and insomnia.     Virtual or Telephone Consent    An interactive audio and video telecommunication system which permits real time communications between the patient (at the originating site) and provider (at the distant site) was utilized to provide this telehealth service.   Verbal consent was requested and obtained from Hermelinda Whelan on this date, 05/15/25 for a telehealth visit and the patient's location was confirmed at the time of the visit.        HPI  Since we last met her mood has improved d/t the improvement of her sleep duration with the use of estradiol 0.5 mg she also reports that she is less irritable but anxiety remains the same. She continues her sleep hygiene habits such as sleep yoga, aromatherapy, reading, white noise,showering at night, weighted blankets and a weighted eye mask.   Denies thoughts of Self-harm and SI/HI/AVH.     Past medications:  Sertraline 50 mg-ineffective       Review of Systems   All other systems reviewed and are negative.        Objective   Physical Exam  Psychiatric:         Attention and Perception: Attention normal.         Mood and Affect: Mood normal.         Speech: Speech normal.         Behavior: Behavior is cooperative.         Thought Content: Thought content normal.         Cognition and Memory: Cognition normal.         Judgment: Judgment normal.         Reviewed OARRS, no discrepancies or concerns. Discussed risk of motor/cognitive impairment, sedation, dependence, tolerance, abuse, withdrawal sequelae, accidental overdose, life-threatening respiratory depression (yu. in combination with alcohol and/or opioids), excess sedation in combination with other sedating medicines.   Acute risk of self-harm remains low despite current stressors (acute and chronic). No reported thoughts of self-harm plan, or intent. She is future-oriented, feels responsibility for her family, and  has no hx of SA or hospitalizations.    Assessment/Plan   Discontinue  fluoxetine 20 mg  to target PMDD.  Continue alprazolam 0.25 mg to target anxiety.  Continue buspirone 15 mg BID to target anxiety.  Continue zolpidem 15 mg to target insomnia.  Utilize the website Psychology Today to find a therapist.   Provided with crisis/emergency resources, including Mobile Crisis [phone], Crisis Text Line (text 5CUCQ ez 458728) and the National Suicide Prevention Lifeline hotline 1-912.628.9281. Agrees to call 911 or go to the nearest emergency department if s/he feels unsafe, or has suicidal thinking with a plan or intent.   Advised to call [phone] to report any urgent concerns and/or schedule a sooner follow-up.   Next appointment:7/15 at 8:30

## 2025-06-09 ENCOUNTER — APPOINTMENT (OUTPATIENT)
Dept: OBSTETRICS AND GYNECOLOGY | Facility: CLINIC | Age: 50
End: 2025-06-09
Payer: COMMERCIAL

## 2025-06-09 VITALS — SYSTOLIC BLOOD PRESSURE: 100 MMHG | DIASTOLIC BLOOD PRESSURE: 64 MMHG | WEIGHT: 134 LBS | BODY MASS INDEX: 23 KG/M2

## 2025-06-09 DIAGNOSIS — G47.09 OTHER INSOMNIA: ICD-10-CM

## 2025-06-09 DIAGNOSIS — N95.1 HOT FLASH, MENOPAUSAL: ICD-10-CM

## 2025-06-09 DIAGNOSIS — N95.1 PERIMENOPAUSE: ICD-10-CM

## 2025-06-09 DIAGNOSIS — N95.1 MENOPAUSAL SYNDROME ON HORMONE REPLACEMENT THERAPY: ICD-10-CM

## 2025-06-09 DIAGNOSIS — Z79.890 MENOPAUSAL SYNDROME ON HORMONE REPLACEMENT THERAPY: ICD-10-CM

## 2025-06-09 PROCEDURE — 99213 OFFICE O/P EST LOW 20 MIN: CPT | Performed by: ADVANCED PRACTICE MIDWIFE

## 2025-06-09 RX ORDER — PROGESTERONE 100 MG/1
100 CAPSULE ORAL NIGHTLY
Qty: 90 CAPSULE | Refills: 3 | Status: SHIPPED | OUTPATIENT
Start: 2025-06-09 | End: 2026-06-09

## 2025-06-09 RX ORDER — ESTRADIOL 0.5 MG/1
0.5 TABLET ORAL DAILY
Qty: 30 TABLET | Refills: 9 | Status: SHIPPED | OUTPATIENT
Start: 2025-06-09 | End: 2026-06-09

## 2025-06-09 NOTE — PROGRESS NOTES
Subjective   Patient ID: Hermelinda Whelan is a 50 y.o. female who presents for Medication Reaction (FU for medication update 6 week fuv  /Christine ROSE /).  HPI  Follow up after initiating HRT    4/28 starte on 0.5mg PO estrace & 100mg prometrium daily     No complaints- hot flashes ended same day starting estradiol 10 days after got period    Menses: irregular cycles     Review of Systems   All other systems reviewed and are negative.      Objective   Physical Exam  Constitutional:       Appearance: Normal appearance. She is normal weight.   Pulmonary:      Effort: Pulmonary effort is normal.   Musculoskeletal:         General: Normal range of motion.      Cervical back: Normal range of motion.   Skin:     General: Skin is warm and dry.   Psychiatric:         Mood and Affect: Mood normal.         Behavior: Behavior normal.         Thought Content: Thought content normal.         Judgment: Judgment normal.         Assessment/Plan   Problem List Items Addressed This Visit    None  Visit Diagnoses         Codes      Perimenopause     N95.1    Relevant Medications    estradiol (Estrace) 0.5 mg tablet    progesterone (Prometrium) 100 mg capsule      Hot flash, menopausal     N95.1    Relevant Medications    estradiol (Estrace) 0.5 mg tablet    progesterone (Prometrium) 100 mg capsule      Other insomnia     G47.09    Relevant Medications    estradiol (Estrace) 0.5 mg tablet    progesterone (Prometrium) 100 mg capsule      Menopausal syndrome on hormone replacement therapy     N95.1, Z79.890    Relevant Medications    estradiol (Estrace) 0.5 mg tablet    progesterone (Prometrium) 100 mg capsule                 LIZETH Michael 06/09/25 10:06 AM

## 2025-06-11 ENCOUNTER — APPOINTMENT (OUTPATIENT)
Dept: PRIMARY CARE | Facility: CLINIC | Age: 50
End: 2025-06-11
Payer: COMMERCIAL

## 2025-06-11 VITALS
OXYGEN SATURATION: 94 % | DIASTOLIC BLOOD PRESSURE: 64 MMHG | HEIGHT: 64 IN | WEIGHT: 132 LBS | HEART RATE: 57 BPM | BODY MASS INDEX: 22.53 KG/M2 | SYSTOLIC BLOOD PRESSURE: 82 MMHG

## 2025-06-11 DIAGNOSIS — F41.9 ANXIETY: ICD-10-CM

## 2025-06-11 DIAGNOSIS — R92.333 HETEROGENEOUSLY DENSE TISSUE OF BOTH BREASTS ON MAMMOGRAPHY: ICD-10-CM

## 2025-06-11 DIAGNOSIS — Z00.00 ANNUAL PHYSICAL EXAM: Primary | ICD-10-CM

## 2025-06-11 DIAGNOSIS — F51.04 CHRONIC INSOMNIA: ICD-10-CM

## 2025-06-11 DIAGNOSIS — Z12.31 ENCOUNTER FOR SCREENING MAMMOGRAM FOR MALIGNANT NEOPLASM OF BREAST: ICD-10-CM

## 2025-06-11 PROCEDURE — 99396 PREV VISIT EST AGE 40-64: CPT | Performed by: SPECIALIST

## 2025-06-11 PROCEDURE — 3008F BODY MASS INDEX DOCD: CPT | Performed by: SPECIALIST

## 2025-06-11 PROCEDURE — 1036F TOBACCO NON-USER: CPT | Performed by: SPECIALIST

## 2025-06-11 ASSESSMENT — ENCOUNTER SYMPTOMS
LOSS OF SENSATION IN FEET: 0
DEPRESSION: 0
OCCASIONAL FEELINGS OF UNSTEADINESS: 0

## 2025-06-11 ASSESSMENT — PATIENT HEALTH QUESTIONNAIRE - PHQ9
2. FEELING DOWN, DEPRESSED OR HOPELESS: NOT AT ALL
1. LITTLE INTEREST OR PLEASURE IN DOING THINGS: NOT AT ALL
SUM OF ALL RESPONSES TO PHQ9 QUESTIONS 1 AND 2: 0

## 2025-06-11 ASSESSMENT — PAIN SCALES - GENERAL: PAINLEVEL_OUTOF10: 0-NO PAIN

## 2025-06-11 NOTE — ASSESSMENT & PLAN NOTE
Discussed dense breast tissue may decrease the sensitivity of the mammogram  Discussed  offers self pay FAST MRI for women with dense breast tissue        Medication list reviewed.    Fax on Dr Walden's desk for signature on 9/19/23.

## 2025-06-11 NOTE — PATIENT INSTRUCTIONS
Recommend annual influenza vaccine  Recommend Covid vaccine annually   Recommend Tdap   Recommend Prevnar 21 or 20 at age 50  Recommend Shingrix vaccines   Please schedule your Mammogram in December

## 2025-06-11 NOTE — PROGRESS NOTES
Subjective   Patient ID: Hermelinda Whelan is a 50 y.o. female who presents for Annual Exam (physical).  HPI    49 yo female Pmhx sig for Insomnia, Anxiety, Gestational DM (1 of her pregnancies) and Fam Hx Melanoma (Father), and Family history of breast cancer in Paternal Grandmother presents for annual exam   History of Present Illness     Saw OBGYN April, now Estrogen in am Progesterone at night and had annual exam  Discussed risks of blood clot  from HRT  HRT seems to help her sleep.  Was really bad from Feb to April, but now getting 6-8 hours sleep    Was having some headaches and bloating, all resolved after diet modifications.  Eats very little gluten now.    Thinks gestational diabetes was a misdiagnosis    Allergies[1]   Current Outpatient Medications   Medication Instructions    ALPRAZolam (XANAX) 0.25 mg, oral, 2 times daily PRN    busPIRone (BUSPAR) 15 mg, oral, 2 times daily    estradiol (ESTRACE) 0.5 mg, oral, Daily    progesterone (PROMETRIUM) 100 mg, oral, Nightly    UNABLE TO FIND Med Name:  N-Acetylcysteine    zolpidem (AMBIEN) 15 mg, oral, Nightly        Review of Systems  Constitutional  No fatigue, no fevers, no chills, no unintentional weight loss,   HEENT:  No headaches, no dizziness, eye exams current (2 wks ago) no double vision, no blurred vision, no hearing loss  Cardiovascular:  No chest pain, no palpitations, no shortness of breath with exertion (one flight of stairs),   Respiratory:  No cough, no hemoptysis, no wheezing, No shortness of breath at rest  GI:  No dysphagia, no odynophagia, no reflux, no abdominal pain, no nausea, no vomiting, no changes in bowel habits, no bright red blood per rectum, no melena  :  No urinary frequency, no dysuria, no urine incontinence, menstruating off on   MSK:  No falls, no joint pain, no joint swelling  Neuro:  No tremors, no extremity weakness, no changes in sensation  Breasts:  No abnormalities on self breast exam no nipple discharge no skin  "changes    Physical Exam  BP 82/64   Pulse 57   Ht 1.626 m (5' 4\")   Wt 59.9 kg (132 lb)   SpO2 94%   BMI 22.66 kg/m²   BP 82/64  General:    Well-appearing  F in no acute distress, well nourished, well hydrated  Head:  Normocephalic, atraumatic  Skin:          Warm dry,   Eyes:  Anicteric sclera, pupils equal,   Ears:        TMs intact  Oral:      Not examined due to pandemic  Neck:   Supple, no cervical/supraclavicular adenopathy, no thyromegaly or nodules appreciated on exam  Cor:      Regular rate, normal S1, S2, no murmurs appreciated, no S3, no S4   Lungs:   Clear to auscultation b/l, no wheezes, no rhonchi, no crackles, no accessory respiratory muscle use  Abd:          Soft, nontender, no guarding, no rebound, no hepatosplenomegaly appreciated   Ext:            No lower extremity edema, no palpable cords  Pulses:      Pedal pulses intact  Neuro:   CN2-12 grossly intact (except funduscopic exam not performed and visual fields not examined)  Breasts:     Declined chaperone, No Axillary adenopathy, no discrete palpable breast nodules, no overlying skin changes, no nipple discharge  Physical Exam         Results       Assessment & Plan       Assessment & Plan  Encounter for screening mammogram for malignant neoplasm of breast  Mammogram 12/19/2024 ordered  Orders:    BI mammo bilateral screening tomosynthesis; Future    Annual physical exam  Continue annual influenza vaccine   Prior Covid vaccine 11/9/2024, recommend annually  Recommend Prevnar 21 or 20  Recommend Shingrix vaccines  Recommend Tdap   Just saw Gyne in April  Mammog 12/19/2024, ordered  Colonoscopy 2/15/2023 repeat 10 yrs  Discussed 1000 mg daily calcium and Vit D3 1000 units daily  Does weight bearing exercise (does strength training)  Forgot to get 2024 labs done, re-ordered  Sees Derm annually  Labs ordered:  CMP CBC Fx Lipids (had a cream in coffee 2 sips) TSH Free T4   Orders:    Comprehensive Metabolic Panel; Future    CBC; Future    " Lipid Panel; Future    Lipoprotein a; Future    Anxiety  On Buspar 15 mg bid and prn alprazolam  Management per psych  Labs ordered  Orders:    Thyroid Stimulating Hormone; Future    Thyroxine, Free; Future    Chronic insomnia  On Zolpidem  Noted improvement on HRT  Management per psych       Heterogeneously dense tissue of both breasts on mammography  Discussed dense breast tissue may decrease the sensitivity of the mammogram  Discussed  offers self pay FAST MRI for women with dense breast tissue          Yvonne Arango DO                 [1] No Known Allergies

## 2025-06-11 NOTE — ASSESSMENT & PLAN NOTE
On Buspar 15 mg bid and prn alprazolam  Management per psych  Labs ordered  Orders:    Thyroid Stimulating Hormone; Future    Thyroxine, Free; Future

## 2025-06-11 NOTE — ASSESSMENT & PLAN NOTE
Continue annual influenza vaccine   Prior Covid vaccine 11/9/2024, recommend annually  Recommend Prevnar 21 or 20  Recommend Shingrix vaccines  Recommend Tdap   Just saw Gyne in April  Mammog 12/19/2024, ordered  Colonoscopy 2/15/2023 repeat 10 yrs  Discussed 1000 mg daily calcium and Vit D3 1000 units daily  Does weight bearing exercise (does strength training)  Forgot to get 2024 labs done, re-ordered  Sees Derm annually  Labs ordered:  CMP CBC Fx Lipids (had a cream in coffee 2 sips) TSH Free T4   Orders:    Comprehensive Metabolic Panel; Future    CBC; Future    Lipid Panel; Future    Lipoprotein a; Future

## 2025-06-13 DIAGNOSIS — F41.9 ANXIETY: ICD-10-CM

## 2025-06-13 RX ORDER — ALPRAZOLAM 0.25 MG/1
0.25 TABLET ORAL 2 TIMES DAILY PRN
Qty: 30 TABLET | Refills: 0 | Status: SHIPPED | OUTPATIENT
Start: 2025-06-13 | End: 2025-07-13

## 2025-06-14 LAB
ALBUMIN SERPL-MCNC: 4.3 G/DL (ref 3.6–5.1)
ALP SERPL-CCNC: 47 U/L (ref 31–125)
ALT SERPL-CCNC: 14 U/L (ref 6–29)
ANION GAP SERPL CALCULATED.4IONS-SCNC: 5 MMOL/L (CALC) (ref 7–17)
AST SERPL-CCNC: 16 U/L (ref 10–35)
BILIRUB SERPL-MCNC: 0.4 MG/DL (ref 0.2–1.2)
BUN SERPL-MCNC: 23 MG/DL (ref 7–25)
CALCIUM SERPL-MCNC: 9.4 MG/DL (ref 8.6–10.2)
CHLORIDE SERPL-SCNC: 105 MMOL/L (ref 98–110)
CHOLEST SERPL-MCNC: 172 MG/DL
CHOLEST/HDLC SERPL: 3 (CALC)
CO2 SERPL-SCNC: 29 MMOL/L (ref 20–32)
CREAT SERPL-MCNC: 0.93 MG/DL (ref 0.5–0.99)
EGFRCR SERPLBLD CKD-EPI 2021: 75 ML/MIN/1.73M2
ERYTHROCYTE [DISTWIDTH] IN BLOOD BY AUTOMATED COUNT: 14.6 % (ref 11–15)
GLUCOSE SERPL-MCNC: 86 MG/DL (ref 65–99)
HCT VFR BLD AUTO: 41.8 % (ref 35–45)
HDLC SERPL-MCNC: 57 MG/DL
HGB BLD-MCNC: 13.1 G/DL (ref 11.7–15.5)
LDLC SERPL CALC-MCNC: 99 MG/DL (CALC)
LPA SERPL-SCNC: 128 NMOL/L
MCH RBC QN AUTO: 30.1 PG (ref 27–33)
MCHC RBC AUTO-ENTMCNC: 31.3 G/DL (ref 32–36)
MCV RBC AUTO: 96.1 FL (ref 80–100)
NONHDLC SERPL-MCNC: 115 MG/DL (CALC)
PLATELET # BLD AUTO: 294 THOUSAND/UL (ref 140–400)
PMV BLD REES-ECKER: 11.1 FL (ref 7.5–12.5)
POTASSIUM SERPL-SCNC: 4.5 MMOL/L (ref 3.5–5.3)
PROT SERPL-MCNC: 6.5 G/DL (ref 6.1–8.1)
RBC # BLD AUTO: 4.35 MILLION/UL (ref 3.8–5.1)
SODIUM SERPL-SCNC: 139 MMOL/L (ref 135–146)
T4 FREE SERPL-MCNC: 1.2 NG/DL (ref 0.8–1.8)
TRIGL SERPL-MCNC: 70 MG/DL
TSH SERPL-ACNC: 2.44 MIU/L
WBC # BLD AUTO: 5.9 THOUSAND/UL (ref 3.8–10.8)

## 2025-07-15 ENCOUNTER — APPOINTMENT (OUTPATIENT)
Dept: BEHAVIORAL HEALTH | Facility: CLINIC | Age: 50
End: 2025-07-15
Payer: COMMERCIAL

## 2025-07-15 DIAGNOSIS — F41.9 ANXIETY: ICD-10-CM

## 2025-07-15 DIAGNOSIS — F51.04 CHRONIC INSOMNIA: ICD-10-CM

## 2025-07-15 RX ORDER — ALPRAZOLAM 0.25 MG/1
0.25 TABLET ORAL 2 TIMES DAILY PRN
Qty: 30 TABLET | Refills: 0 | Status: SHIPPED | OUTPATIENT
Start: 2025-07-15 | End: 2025-08-14

## 2025-07-15 RX ORDER — ZOLPIDEM TARTRATE 10 MG/1
15 TABLET ORAL NIGHTLY
Qty: 45 TABLET | Refills: 1 | Status: SHIPPED | OUTPATIENT
Start: 2025-07-15 | End: 2025-09-13

## 2025-07-17 ENCOUNTER — TELEMEDICINE (OUTPATIENT)
Dept: BEHAVIORAL HEALTH | Facility: CLINIC | Age: 50
End: 2025-07-17
Payer: COMMERCIAL

## 2025-07-17 DIAGNOSIS — F41.1 GAD (GENERALIZED ANXIETY DISORDER): ICD-10-CM

## 2025-07-17 PROCEDURE — 99213 OFFICE O/P EST LOW 20 MIN: CPT

## 2025-07-17 PROCEDURE — 1036F TOBACCO NON-USER: CPT

## 2025-07-17 RX ORDER — BUSPIRONE HYDROCHLORIDE 15 MG/1
15 TABLET ORAL 2 TIMES DAILY
Qty: 180 TABLET | Refills: 0 | Status: SHIPPED | OUTPATIENT
Start: 2025-07-17 | End: 2026-07-17

## 2025-07-17 NOTE — PROGRESS NOTES
Subjective   Patient ID: Hermelinda Whelan is a 50 y.o. female who presents for PMDD and insomnia.     Virtual or Telephone Consent    An interactive audio and video telecommunication system which permits real time communications between the patient (at the originating site) and provider (at the distant site) was utilized to provide this telehealth service.   Verbal consent was requested and obtained from Hermelinda Whelan on this date, 07/17/25 for a telehealth visit and the patient's location was confirmed at the time of the visit.     HPI  Since our last session  she started HRT  and no longer waking up at 1:00 am and now she is able to sleep until 4-5 am, hot flashes have resolved, anxiety and irritability has decreased.   Denies thoughts of Self-harm and SI/HI/AVH.     Past medications:  Sertraline 50 mg-ineffective     Review of Systems   All other systems reviewed and are negative.        Objective   Physical Exam    Psychiatric:         Attention and Perception: Attention normal.         Mood and Affect: Mood normal.         Speech: Speech normal.         Behavior: Behavior is cooperative.         Thought Content: Thought content normal.         Cognition and Memory: Cognition normal.         Judgment: Judgment normal.       Reviewed OARRS, no discrepancies or concerns. Discussed risk of motor/cognitive impairment, sedation, dependence, tolerance, abuse, withdrawal sequelae, accidental overdose, life-threatening respiratory depression (yu. in combination with alcohol and/or opioids), excess sedation in combination with other sedating medicines.   Acute risk of self-harm remains low despite current stressors (acute and chronic). No reported thoughts of self-harm plan, or intent. She is future-oriented, feels responsibility for her family, and has no hx of SA or hospitalizations.      Assessment/Plan   Continue alprazolam 0.25 mg to target anxiety.  Continue buspirone 15 mg BID to target  anxiety.  Continue zolpidem 15 mg to target insomnia.  Utilize the website [employer] to find a therapist.   Provided with crisis/emergency resources, including Mobile Crisis [phone], Crisis Text Line (text 6SLQT wl 637788) and the National Suicide Prevention Lifeline hotline 1-302.766.2979. Agrees to call 911 or go to the nearest emergency department if s/he feels unsafe, or has suicidal thinking with a plan or intent.   Advised to call [phone] to report any urgent concerns and/or schedule a sooner follow-up.   Next appointment:10/6 at 10:00

## 2025-07-22 ENCOUNTER — APPOINTMENT (OUTPATIENT)
Dept: PULMONOLOGY | Facility: CLINIC | Age: 50
End: 2025-07-22
Payer: COMMERCIAL

## 2025-07-29 DIAGNOSIS — G47.9 SLEEP DISTURBANCE: ICD-10-CM

## 2025-07-29 DIAGNOSIS — Z79.890 HORMONE REPLACEMENT THERAPY: Primary | ICD-10-CM

## 2025-07-29 DIAGNOSIS — N95.1 PERIMENOPAUSAL VASOMOTOR SYMPTOMS: ICD-10-CM

## 2025-07-29 DIAGNOSIS — N95.1 PERIMENOPAUSE: ICD-10-CM

## 2025-07-29 RX ORDER — ESTRADIOL 1 MG/1
1 TABLET ORAL DAILY
Qty: 90 TABLET | Refills: 3 | Status: SHIPPED | OUTPATIENT
Start: 2025-07-29 | End: 2026-07-29

## 2025-08-13 DIAGNOSIS — F51.04 CHRONIC INSOMNIA: ICD-10-CM

## 2025-08-13 DIAGNOSIS — F41.9 ANXIETY: ICD-10-CM

## 2025-08-13 RX ORDER — ALPRAZOLAM 0.25 MG/1
0.25 TABLET ORAL 2 TIMES DAILY PRN
Qty: 30 TABLET | Refills: 0 | Status: SHIPPED | OUTPATIENT
Start: 2025-08-13 | End: 2025-09-12

## 2025-08-13 RX ORDER — ZOLPIDEM TARTRATE 10 MG/1
15 TABLET ORAL NIGHTLY
Qty: 45 TABLET | Refills: 1 | Status: SHIPPED | OUTPATIENT
Start: 2025-08-13 | End: 2025-10-12

## 2025-08-19 ENCOUNTER — TELEPHONE (OUTPATIENT)
Dept: PULMONOLOGY | Facility: CLINIC | Age: 50
End: 2025-08-19
Payer: COMMERCIAL

## 2025-10-06 ENCOUNTER — APPOINTMENT (OUTPATIENT)
Dept: BEHAVIORAL HEALTH | Facility: CLINIC | Age: 50
End: 2025-10-06
Payer: COMMERCIAL

## 2026-04-07 ENCOUNTER — APPOINTMENT (OUTPATIENT)
Dept: DERMATOLOGY | Facility: CLINIC | Age: 51
End: 2026-04-07
Payer: COMMERCIAL

## 2026-04-28 ENCOUNTER — APPOINTMENT (OUTPATIENT)
Dept: OBSTETRICS AND GYNECOLOGY | Facility: CLINIC | Age: 51
End: 2026-04-28
Payer: COMMERCIAL